# Patient Record
Sex: MALE | Race: WHITE
[De-identification: names, ages, dates, MRNs, and addresses within clinical notes are randomized per-mention and may not be internally consistent; named-entity substitution may affect disease eponyms.]

---

## 2017-03-06 LAB
ANION GAP SERPL CALC-SCNC: 6 MMOL/L (ref 3–11)
APPEARANCE UR: CLEAR
BASOPHILS # BLD: 0.04 K/UL (ref 0–0.2)
BASOPHILS NFR BLD: 0.5 %
BILIRUB UR-MCNC: (no result) MG/DL
BUN SERPL-MCNC: 19 MG/DL (ref 7–18)
BUN/CREAT SERPL: 18.9 (ref 10–20)
CALCIUM SERPL-MCNC: 8.7 MG/DL (ref 8.5–10.1)
CHLORIDE SERPL-SCNC: 105 MMOL/L (ref 98–107)
CO2 SERPL-SCNC: 31 MMOL/L (ref 21–32)
COLOR UR: YELLOW
COMPLETE: YES
CREAT CL PREDICTED SERPL C-G-VRATE: 86.1 ML/MIN
CREAT SERPL-MCNC: 1 MG/DL (ref 0.6–1.4)
EOSINOPHIL NFR BLD AUTO: 178 K/UL (ref 130–400)
GLUCOSE SERPL-MCNC: 85 MG/DL (ref 70–99)
HCT VFR BLD CALC: 43 % (ref 42–52)
IG%: 0.5 %
IMM GRANULOCYTES NFR BLD AUTO: 21.6 %
LYMPHOCYTES # BLD: 1.63 K/UL (ref 1.2–3.4)
MANUAL MICROSCOPIC REQUIRED?: NO
MCH RBC QN AUTO: 29.3 PG (ref 25–34)
MCHC RBC AUTO-ENTMCNC: 33.5 G/DL (ref 32–36)
MCV RBC AUTO: 87.4 FL (ref 80–100)
MONOCYTES NFR BLD: 8.3 %
NEUTROPHILS # BLD AUTO: 2.6 %
NEUTROPHILS NFR BLD AUTO: 66.5 %
NITRITE UR QL STRIP: (no result)
PH UR STRIP: 5 [PH] (ref 4.5–7.5)
PMV BLD AUTO: 8.9 FL (ref 7.4–10.4)
POTASSIUM SERPL-SCNC: 4.2 MMOL/L (ref 3.5–5.1)
RBC # BLD AUTO: 4.92 M/UL (ref 4.7–6.1)
REVIEW REQ?: NO
SODIUM SERPL-SCNC: 142 MMOL/L (ref 136–145)
SP GR UR STRIP: 1.02 (ref 1–1.03)
URINE BILL WITH OR WITHOUT MIC: (no result)
UROBILINOGEN UR-MCNC: (no result) MG/DL
WBC # BLD AUTO: 7.56 K/UL (ref 4.8–10.8)

## 2017-03-06 NOTE — DIAGNOSTIC IMAGING REPORT
CHEST PREADMISSION(PA/LAT)



CLINICAL HISTORY: PAT preoperative evaluation



COMPARISON STUDY:  6/16/2014



FINDINGS: Fixed lateral hernia. Lungs are clear. Diaphragms are smooth. 



IMPRESSION:  No acute process. 









Electronically signed by:  Franck Martinez M.D.

3/6/2017 9:51 AM



Dictated Date/Time:  3/6/2017 9:49 AM

## 2017-03-06 NOTE — PAT MEDICATION INSTRUCTIONS
Service Date


Mar 6, 2017.





Current Home Medication List


Albuterol Hfa (Ventolin Hfa), 2-4 PUFFS INH Q6H


Amoxicillin (Amoxil), 2,000 MG PO AS DIRECTED


Aspirin (Aspirin Ec), 81 MG PO QPM


Cholecalciferol (Vitamin D 1000 Unit), 1,000 INTER.UNIT PO BID


Colchicine (Colchicine), 0.6 MG PO PRN


Cyanocobalamin (Vitamin B12 500MCG), 500 MCG PO QAM


Dutasteride (Avodart), 0.5 MG PO QAM


Iron-Vitamin C (Vitron-C), 1 TAB PO BID


Metoprolol Succ (Toprol Xl) (Toprol-Xl), 12.5 MG PO QAM


Pantoprazole (Protonix), 40 MG PO QAM


Tadalafil (Cialis), 20 MG PO UD


Tamsulosin Hcl (Flomax), 0.4 MG PO BID





Medication Instructions


For Your Scheduled Surgery 





Amoxicillin (Amoxil), 2,000 MG PO AS DIRECTED (only takes prior to dental 

procedures)





Colchicine (Colchicine), 0.6 MG PO PRN (not taking currently)





- Hold the following medications 24 hours prior to surgery:


   Tadalafil (Cialis), 20 MG PO UD   





- Hold the following medications the morning of surgery:


   Iron-Vitamin C (Vitron-C), 1 TAB PO BID


   Cyanocobalamin (Vitamin B12 500MCG), 500 MCG PO QAM


   Cholecalciferol (Vitamin D 1000 Unit), 1,000 INTER.UNIT PO BID





- Take the following medications the morning of surgery with a sip of water:


   Tamsulosin Hcl (Flomax), 0.4 MG PO BID


   Pantoprazole (Protonix), 40 MG PO QAM


   Metoprolol Succ (Toprol Xl) (Toprol-Xl), 12.5 MG PO QAM


   Dutasteride (Avodart), 0.5 MG PO QAM


   Albuterol Hfa (Ventolin Hfa), 2-4 PUFFS INH Q6H (bring with you to hospital 

on day of surgery)





- Take the following medications as scheduled the night before surgery:


   Tamsulosin Hcl (Flomax), 0.4 MG PO BID


   Cholecalciferol (Vitamin D 1000 Unit), 1,000 INTER.UNIT PO BID


   Aspirin (Aspirin Ec), 81 MG PO QPM


   Albuterol Hfa (Ventolin Hfa), 2-4 PUFFS INH Q6H











If you have any questions please call us at [phone] or [phone] (

[employer]) or [phone]

## 2017-03-21 NOTE — HISTORY & PHYSICAL EXAMINATION
DATE OF ADMISSION:  03/22/2017

 

HISTORY OF PRESENT ILLNESS:  The patient presents to our office with a

complaint of balance discrepancy as well as upper extremity numbness and

paresthesias.  He also admits to bilateral leg pain.  He reports buckling of

his left leg.  He denies bowel or bladder changes.

 

PAST MEDICAL HISTORY:  Significant for arthritis, BPH.

 

PAST SURGICAL HISTORY:  Significant for hip replacement bilaterally and

hernia repair.

 

ALLERGIES:  None listed.

 

MEDICATIONS:  Include:

1.  Celebrex 200 mg twice a day.

2.  Simvastatin 20 mg a day.

3.  Flomax 0.4 mg daily.

 

SOCIAL HISTORY:  He is , has 4 children.  Denies alcohol.  Denies

tobacco use.

 

FAMILY HISTORY:  Significant for cancer, cardiovascular disease and

arthritis.

 

REVIEW OF SYSTEMS:  Significant for hearing loss, ringing in ears, muscle

weakness.

 

PHYSICAL EXAMINATION:

VITAL SIGNS:  6 feet 2 inches, 220 pounds.

HEENT:  Speech appropriate.

CARDIOPULMONARY:  No gross abnormalities.

ABDOMEN:  Soft, nondistended.

GENITOURINARY:  Deferred.

NEUROLOGIC:  Cranial nerves II-XII grossly intact.

MUSCULOSKELETAL:  He ambulates with an independent steady gait.  Strength is

intact bilaterally.

 

ASSESSMENT:  Severe spinal stenosis at C3-C4 with intrinsic cord changes,

bilateral neural foraminal stenosis C3-C4.

 

PLAN:  At this point in time, we have reviewed surgical intervention which

would require anterior cervical discectomy and fusion at C3-4.  Risks,

benefits, pros, cons, and alternatives were outlined in detail.  The patient

would like to proceed with the above-mentioned surgical planning.

## 2017-03-22 ENCOUNTER — HOSPITAL ENCOUNTER (INPATIENT)
Dept: HOSPITAL 45 - C.ACU | Age: 72
LOS: 1 days | Discharge: HOME | DRG: 473 | End: 2017-03-23
Attending: ORTHOPAEDIC SURGERY | Admitting: ORTHOPAEDIC SURGERY
Payer: COMMERCIAL

## 2017-03-22 VITALS
TEMPERATURE: 96.44 F | SYSTOLIC BLOOD PRESSURE: 150 MMHG | OXYGEN SATURATION: 96 % | DIASTOLIC BLOOD PRESSURE: 87 MMHG | HEART RATE: 82 BPM

## 2017-03-22 VITALS
SYSTOLIC BLOOD PRESSURE: 152 MMHG | TEMPERATURE: 97.7 F | OXYGEN SATURATION: 97 % | DIASTOLIC BLOOD PRESSURE: 74 MMHG | HEART RATE: 67 BPM

## 2017-03-22 VITALS
TEMPERATURE: 97.52 F | HEART RATE: 78 BPM | SYSTOLIC BLOOD PRESSURE: 171 MMHG | OXYGEN SATURATION: 95 % | DIASTOLIC BLOOD PRESSURE: 95 MMHG

## 2017-03-22 VITALS — HEART RATE: 65 BPM | DIASTOLIC BLOOD PRESSURE: 80 MMHG | OXYGEN SATURATION: 95 % | SYSTOLIC BLOOD PRESSURE: 136 MMHG

## 2017-03-22 VITALS
TEMPERATURE: 97.7 F | SYSTOLIC BLOOD PRESSURE: 157 MMHG | HEART RATE: 82 BPM | OXYGEN SATURATION: 97 % | DIASTOLIC BLOOD PRESSURE: 98 MMHG

## 2017-03-22 VITALS — HEART RATE: 52 BPM | OXYGEN SATURATION: 94 % | SYSTOLIC BLOOD PRESSURE: 135 MMHG | DIASTOLIC BLOOD PRESSURE: 78 MMHG

## 2017-03-22 VITALS
DIASTOLIC BLOOD PRESSURE: 74 MMHG | HEART RATE: 80 BPM | OXYGEN SATURATION: 96 % | TEMPERATURE: 97.7 F | SYSTOLIC BLOOD PRESSURE: 136 MMHG

## 2017-03-22 VITALS
OXYGEN SATURATION: 95 % | DIASTOLIC BLOOD PRESSURE: 78 MMHG | HEART RATE: 81 BPM | SYSTOLIC BLOOD PRESSURE: 136 MMHG | TEMPERATURE: 97.34 F

## 2017-03-22 VITALS — HEART RATE: 84 BPM | OXYGEN SATURATION: 96 %

## 2017-03-22 VITALS — HEART RATE: 53 BPM | OXYGEN SATURATION: 98 %

## 2017-03-22 VITALS
SYSTOLIC BLOOD PRESSURE: 148 MMHG | HEART RATE: 79 BPM | OXYGEN SATURATION: 96 % | TEMPERATURE: 94.46 F | DIASTOLIC BLOOD PRESSURE: 77 MMHG

## 2017-03-22 VITALS — SYSTOLIC BLOOD PRESSURE: 131 MMHG | OXYGEN SATURATION: 95 % | HEART RATE: 88 BPM | DIASTOLIC BLOOD PRESSURE: 75 MMHG

## 2017-03-22 VITALS — TEMPERATURE: 97.7 F

## 2017-03-22 VITALS — HEART RATE: 68 BPM | OXYGEN SATURATION: 98 %

## 2017-03-22 VITALS — OXYGEN SATURATION: 98 % | HEART RATE: 73 BPM

## 2017-03-22 VITALS
BODY MASS INDEX: 31.52 KG/M2 | BODY MASS INDEX: 31.52 KG/M2 | HEIGHT: 74 IN | WEIGHT: 245.59 LBS | HEIGHT: 74 IN | WEIGHT: 245.59 LBS

## 2017-03-22 DIAGNOSIS — M48.02: Primary | ICD-10-CM

## 2017-03-22 DIAGNOSIS — N40.0: ICD-10-CM

## 2017-03-22 DIAGNOSIS — Z79.82: ICD-10-CM

## 2017-03-22 DIAGNOSIS — Z79.899: ICD-10-CM

## 2017-03-22 DIAGNOSIS — M19.90: ICD-10-CM

## 2017-03-22 DIAGNOSIS — Z82.61: ICD-10-CM

## 2017-03-22 DIAGNOSIS — Z82.49: ICD-10-CM

## 2017-03-22 DIAGNOSIS — Z96.643: ICD-10-CM

## 2017-03-22 DIAGNOSIS — M54.12: ICD-10-CM

## 2017-03-22 PROCEDURE — 0RG1070 FUSION OF CERVICAL VERTEBRAL JOINT WITH AUTOLOGOUS TISSUE SUBSTITUTE, ANTERIOR APPROACH, ANTERIOR COLUMN, OPEN APPROACH: ICD-10-PCS | Performed by: ORTHOPAEDIC SURGERY

## 2017-03-22 PROCEDURE — 0RT30ZZ RESECTION OF CERVICAL VERTEBRAL DISC, OPEN APPROACH: ICD-10-PCS | Performed by: ORTHOPAEDIC SURGERY

## 2017-03-22 RX ADMIN — DOCUSATE SODIUM SCH MG: 100 CAPSULE, LIQUID FILLED ORAL at 20:36

## 2017-03-22 RX ADMIN — ALBUTEROL SULFATE SCH PUFFS: 90 AEROSOL, METERED RESPIRATORY (INHALATION) at 18:06

## 2017-03-22 RX ADMIN — ALUMINUM ZIRCONIUM TRICHLOROHYDREX GLY SCH EA: 0.2 STICK TOPICAL at 15:29

## 2017-03-22 RX ADMIN — CEFAZOLIN SCH MLS/HR: 10 INJECTION, POWDER, FOR SOLUTION INTRAVENOUS at 23:27

## 2017-03-22 RX ADMIN — ALBUTEROL SULFATE SCH PUFFS: 90 AEROSOL, METERED RESPIRATORY (INHALATION) at 23:26

## 2017-03-22 RX ADMIN — FENTANYL CITRATE PRN MCG: 50 INJECTION, SOLUTION INTRAMUSCULAR; INTRAVENOUS at 09:58

## 2017-03-22 RX ADMIN — DEXAMETHASONE SODIUM PHOSPHATE SCH MLS/MIN: 4 INJECTION, SOLUTION INTRA-ARTICULAR; INTRALESIONAL; INTRAMUSCULAR; INTRAVENOUS; SOFT TISSUE at 15:28

## 2017-03-22 RX ADMIN — ALBUTEROL SULFATE SCH PUFFS: 90 AEROSOL, METERED RESPIRATORY (INHALATION) at 12:14

## 2017-03-22 RX ADMIN — FENTANYL CITRATE PRN MCG: 50 INJECTION, SOLUTION INTRAMUSCULAR; INTRAVENOUS at 10:08

## 2017-03-22 RX ADMIN — SODIUM CHLORIDE SCH MLS/HR: 900 INJECTION, SOLUTION INTRAVENOUS at 11:29

## 2017-03-22 RX ADMIN — HYDROCODONE BITARTRATE AND ACETAMINOPHEN PRN TAB: 5; 325 TABLET ORAL at 14:37

## 2017-03-22 RX ADMIN — ALUMINUM ZIRCONIUM TRICHLOROHYDREX GLY SCH EA: 0.2 STICK TOPICAL at 23:28

## 2017-03-22 RX ADMIN — DEXAMETHASONE SODIUM PHOSPHATE SCH MLS/MIN: 4 INJECTION, SOLUTION INTRA-ARTICULAR; INTRALESIONAL; INTRAMUSCULAR; INTRAVENOUS; SOFT TISSUE at 23:27

## 2017-03-22 RX ADMIN — FENTANYL CITRATE PRN MCG: 50 INJECTION, SOLUTION INTRAMUSCULAR; INTRAVENOUS at 09:53

## 2017-03-22 RX ADMIN — CEFAZOLIN SCH MLS/HR: 10 INJECTION, POWDER, FOR SOLUTION INTRAVENOUS at 15:35

## 2017-03-22 RX ADMIN — TAMSULOSIN HYDROCHLORIDE SCH MG: 0.4 CAPSULE ORAL at 20:36

## 2017-03-22 RX ADMIN — SODIUM CHLORIDE SCH MLS/HR: 900 INJECTION, SOLUTION INTRAVENOUS at 22:18

## 2017-03-22 NOTE — OPERATIVE REPORT
DATE OF OPERATION:  03/22/2017

 

PREOPERATIVE DIAGNOSIS:  Cervical spinal stenosis with myeloradiculopathy.

 

POSTOPERATIVE DIAGNOSIS:  Same.

 

PROCEDURE PERFORMED:

1.  Anterior cervical discectomy, bilateral foraminotomy C3-C4.

2.  Anterior cervical arthrodesis C3-C4.

3.  Placement of cortical autograft 8 mm in height filled with DBM, C3-C4.

4.  Application of Thakkar plate and screws across C3-C4.

 

SURGEON:  Dr. Lane Maloney.

 

ANESTHESIA:  General.

 

DISPOSITION:  The patient awakened and taken to PACU in stable condition.

 

HISTORY OF PATIENT'S PROBLEMS:  This is a 72-year-old male who presents with

above-mentioned diagnosis.  After failing an extensive course of nonoperative

care, elected to undergo the above-mentioned procedure.  Risks, benefits,

pros, cons, and alternatives were outlined in detail preoperatively.

 

DESCRIPTION OF PROCEDURE:  The patient was met with preoperatively, the case

discussed and all questions were addressed.  At that point, the patient was

taken back to operative suite, and after undergoing successful general

intubation via department of anesthesia, was placed in a supine position with

the head in [place] head villarreal.  All bony prominences were well padded and

the eyes were inspected to ensure there was no external pressure placed upon

them.  At this point, the anterior cervical spine was prepped and draped in

normal sterile fashion.  With the assistance of fluoroscopy, we identified

the C3-C4 disc space and transverse incision was placed along the right

anterior aspect of the cervical spine overlying this region.  Sharp

dissection with the assistance of bipolar electrocautery performed down to

and exposing the anterior cervical spine from C3-C4.  A self-retaining

retractor was placed.  We verified our position with fluoroscopy.  A complete

discectomy was then performed out to the uncovertebral joints bilaterally. 

Tubac distracting pins were utilized to assist us in our visualization.  We

did remove all posterior annular fibers, longitudinal ligament and all spurs.

 The endplates were then burred to subcortical bleeding bone and 8 mm

cortical autograft filled with DBM tapped in position.  Distracting apparatus

was removed.  All anterior osteophytes burred to a smooth cortical surface

and Thakkar plate and screws applied with the assistance of fluoroscopy.  The

incision was then copiously irrigated, explored to ensure there was no damage

to surrounding structures or remaining bleeding, and a 10 round RUMA drain

inserted, then closed with 2-0 Vicryl in the fascia, 4-0 Monocryl for final

skin closure.  Steri-Strips and sterile dressing placed.  The patient was

awakened and taken to PACU in stable condition.

 

 

I attest to the content of the Intraoperative Record and any orders documented therein. Any exceptio
ns are noted below.

## 2017-03-22 NOTE — [facility] POST OPERATIVE BRIEF NOTE
Immediate Operative Summary


Operative Date


Mar 22, 2017.





Pre-Operative Diagnosis





Severe Spinal Stenosis C3-C4, Intrinsic Cord Change, Bilateral Neural Foraminal 


Stenosis C3-C4





Post-Operative Diagnosis





Severe Spinal Stenosis C3-C4, Intrinsic Cord Change, Bilateral Neural Foraminal 


Stenosis C3-C4





Procedure(s) Performed





C3-C4 Anterior Cervical Discectomy and Fusion; Removal of Intervertebral 


Disc/Decompression; Placement of Prosthetic Spacer; Anterior Plate and Screw 


Fixation





Surgeon


Dr. Maloney





Assistant Surgeon(s)


none





Estimated Blood Loss


20 cc





Findings


stenosis





Specimens





none per surgeon

## 2017-03-22 NOTE — ANESTHESIOLOGY PROGRESS NOTE
Anesthesia Post Op Note


Date & Time


Mar 22, 2017 at 10:24





Vital Signs


Pain Intensity:  4





 Vital Signs Past 12 Hours








  Date Time  Temp Pulse Resp B/P Pulse Ox O2 Delivery O2 Flow Rate FiO2


 


3/22/17 10:15  71 16 134/82 97 Nasal Cannula 5 


 


3/22/17 10:05  78 16 147/79 99 Nasal Cannula 5 


 


3/22/17 09:55  79 16 163/94 94 Mask 10 


 


3/22/17 09:45  79 14 143/86 97 Mask 10 


 


3/22/17 09:35  68 14 154/83 98 Mask 10 


 


3/22/17 09:25 36.0 74 14 148/86 99 Mask 10 


 


3/22/17 06:07 36.5 67 18 152/74 97 Room Air  











Notes


Mental Status:  alert / awake / arousable, participated in evaluation


Pt Amnestic to Procedure:  Yes


Nausea / Vomiting:  adequately controlled


Pain:  adequately controlled


Airway Patency, RR, SpO2:  stable & adequate


BP & HR:  stable & adequate


Hydration State:  stable & adequate


Anesthetic Complications:  no major complications apparent

## 2017-03-22 NOTE — DIAGNOSTIC IMAGING REPORT
INTRAOPERATIVE RADIOGRAPHS



CLINICAL HISTORY: C3-C4 spinal fusion.



Fluoroscopy time: 7 seconds.



FINDINGS: 2 spot fluoroscopic views of the cervical spine are presented. There

is evidence of discectomy at C3-C4 with anterior fusion at this level. The

orthopedic hardware appears intact. An endotracheal tube is noted.



IMPRESSION: Intraoperative images from anterior fusion at C3-C4 as above.







Electronically signed by:  Long Tiwari M.D.

3/22/2017 9:55 AM



Dictated Date/Time:  3/22/2017 9:54 AM

## 2017-03-23 VITALS
TEMPERATURE: 98.24 F | HEART RATE: 79 BPM | DIASTOLIC BLOOD PRESSURE: 78 MMHG | OXYGEN SATURATION: 98 % | SYSTOLIC BLOOD PRESSURE: 164 MMHG

## 2017-03-23 VITALS
OXYGEN SATURATION: 93 % | DIASTOLIC BLOOD PRESSURE: 86 MMHG | TEMPERATURE: 97.7 F | SYSTOLIC BLOOD PRESSURE: 142 MMHG | HEART RATE: 67 BPM

## 2017-03-23 VITALS
DIASTOLIC BLOOD PRESSURE: 81 MMHG | TEMPERATURE: 97.7 F | HEART RATE: 68 BPM | OXYGEN SATURATION: 95 % | SYSTOLIC BLOOD PRESSURE: 138 MMHG

## 2017-03-23 VITALS
HEART RATE: 67 BPM | DIASTOLIC BLOOD PRESSURE: 75 MMHG | TEMPERATURE: 98.24 F | OXYGEN SATURATION: 95 % | SYSTOLIC BLOOD PRESSURE: 138 MMHG

## 2017-03-23 VITALS — HEART RATE: 71 BPM | OXYGEN SATURATION: 98 %

## 2017-03-23 VITALS
TEMPERATURE: 97.34 F | DIASTOLIC BLOOD PRESSURE: 70 MMHG | HEART RATE: 59 BPM | SYSTOLIC BLOOD PRESSURE: 155 MMHG | OXYGEN SATURATION: 96 %

## 2017-03-23 VITALS — OXYGEN SATURATION: 96 % | HEART RATE: 65 BPM

## 2017-03-23 VITALS — OXYGEN SATURATION: 98 %

## 2017-03-23 VITALS — HEART RATE: 60 BPM | OXYGEN SATURATION: 96 %

## 2017-03-23 RX ADMIN — DOCUSATE SODIUM SCH MG: 100 CAPSULE, LIQUID FILLED ORAL at 10:09

## 2017-03-23 RX ADMIN — ALUMINUM ZIRCONIUM TRICHLOROHYDREX GLY SCH EA: 0.2 STICK TOPICAL at 08:00

## 2017-03-23 RX ADMIN — DEXAMETHASONE SODIUM PHOSPHATE SCH MLS/MIN: 4 INJECTION, SOLUTION INTRA-ARTICULAR; INTRALESIONAL; INTRAMUSCULAR; INTRAVENOUS; SOFT TISSUE at 08:11

## 2017-03-23 RX ADMIN — ALBUTEROL SULFATE SCH PUFFS: 90 AEROSOL, METERED RESPIRATORY (INHALATION) at 05:48

## 2017-03-23 RX ADMIN — SODIUM CHLORIDE SCH MLS/HR: 900 INJECTION, SOLUTION INTRAVENOUS at 10:11

## 2017-03-23 RX ADMIN — TAMSULOSIN HYDROCHLORIDE SCH MG: 0.4 CAPSULE ORAL at 10:09

## 2017-03-23 RX ADMIN — CEFAZOLIN SCH MLS/HR: 10 INJECTION, POWDER, FOR SOLUTION INTRAVENOUS at 08:11

## 2017-03-23 RX ADMIN — HYDROCODONE BITARTRATE AND ACETAMINOPHEN PRN TAB: 5; 325 TABLET ORAL at 01:47

## 2017-03-23 NOTE — ANESTHESIOLOGY PROGRESS NOTE
Anesthesia Post Op Note


Date & Time


Mar 23, 2017 at 10:40





Vital Signs


Pain Intensity:  2.0





 Vital Signs Past 12 Hours








  Date Time  Temp Pulse Resp B/P Pulse Ox O2 Delivery O2 Flow Rate FiO2


 


3/23/17 10:26 36.8 79 16  98 Room Air  


 


3/23/17 09:00     98 Nasal Cannula 2.0 


 


3/23/17 08:00      Nasal Cannula 2.0 


 


3/23/17 08:00 36.8 79 16 164/78 98 Humidified Oxygen 2.0 


 


3/23/17 07:52  71 16  98 Nasal Cannula 3.0 


 


3/23/17 05:45 36.5 68 16 138/81 95 Nasal Cannula 3.0 





      Humidified Air  


 


3/23/17 04:06  65 16  96 Nasal Cannula 3.0 


 


3/23/17 04:00 36.8 67 14 138/75 95 Nasal Cannula 3.0 





      Humidified Air  


 


3/23/17 01:41 36.5 67 14 142/86 93 Nasal Cannula 3.0 





      Humidified Air  


 


3/22/17 23:35 36.5 80 16 136/74 96 Nasal Cannula 2.0 





      Humidified Air  


 


3/22/17 23:19  68 16  98 Nasal Cannula 3.0 


 


3/22/17 23:15      Nasal Cannula 3.0 





      Humidified Air  











Notes


Mental Status:  alert / awake / arousable, participated in evaluation


Pt Amnestic to Procedure:  Yes


Nausea / Vomiting:  adequately controlled


Pain:  adequately controlled


Airway Patency, RR, SpO2:  stable & adequate


BP & HR:  stable & adequate


Hydration State:  stable & adequate


Anesthetic Complications:  no major complications apparent

## 2017-03-23 NOTE — DISCHARGE SUMMARY
PRINCIPLE DIAGNOSIS:  Cervical stenosis.

 

HOSPITAL COURSE AS FOLLOWS:  On March 22, the patient underwent anterior

cervical discectomy and fusion, tolerated this well and taken to the

orthopedic floor postoperatively.  Postop day #1, he swallowing has markedly

improved.  RUMA drain decreased appropriately.  Arm symptoms doing well. 

Subsequently discharged home.  Discharge orders and instructions found on the

chart for further review.

## 2017-03-23 NOTE — DISCHARGE INSTRUCTIONS
Discharge Instructions


Date of Service


Mar 23, 2017.





Admission


Reason for Admission:  Cervical Spinal Stenosis





Discharge


Discharge Diagnosis / Problem:  stenosis





Discharge Goals


Goal(s):  Improve function





Activity Recommendations


Activity Limitations:  per Instructions/Follow-up section





.





Instructions / Follow-Up


Instructions / Follow-Up





ACTIVITY RECOMMENDATIONS:








SELF CARE INSTRUCTIONS AFTER CERVICAL FUSIONS





1.  No smoking.  Smoking drastically decreases the chance of a solid fusion.





2.  No bending, lifting more than 5 pounds, or twisting (roll like a log when


     turning in bed).





3.  You may shower 3 days after surgery.  Thoroughly dry wound.  Do not


     soak in the tub.





4.  Cervical collar:  Must be worn at all times including sleeping.  You may


     remove the brace only to bath, eat and if you are sitting in a recliner.





5.  Please walk as much as you can for exercise.  Gradually increase the 


     distance that you walk as your endurance increases.








SPECIAL CARE INSTRUCTIONS:





**VERY IMPORTANT TO READ AND REVIEW**





A.  Do not take any anti-inflammatory medications (i.e. Indocin, Advil, Aspirin,


     Naprosyn, Aleve, Motrin, etc.) as these may inhibit the chance of a solid 


     fusion.  Tylenol is okay to take.





B.  Your surgical incision has been closed with a cosmetic suture under the skin


     that will dissolve in about 6 weeks.  In 14 days, you can use a pair of 

clean


     scissors and cut the suture that is left outside of the skin at the ends 

of 


     your incision.





C.  Complications are uncommon, but please contact us if you have any signs or 


     symptoms of:


   1.  wound infection (fever higher than 102.5 degrees F, redness, separation


              of wound, drainage, or increasing pain from the incision) 


   2.  blood clots in legs (pain, swelling, redness and warmth in legs)


   3.  urinary tract infection (fever higher than 102.5 degrees, burning upon 


              urination or increased frequency of urination)


   4.  nerve problems (inability to walk on your toes or heels, numbness, loss 


              of bowel or bladder control)


   5.  any other symptoms that concern you.





D.  Please call the office at [phone] if you have any concerns or 

questions 


     about your operation or recovery.








MANAGING PAIN AFTER SPINAL SURGERY





1.  Narcotic medication is intended for short-term use and will be provided for 


     surgical pain.  Surgical pain usually lasts for a period of 4-6 weeks.  

Narcotic


     medication includes Percocet, Vicodin, Darvocet, Tylenol #3 or Lortab.





2.  Longer-term pain is more appropriately treated with non-narcotic medication


    such as Tylenol ES.





3.  Muscle spasm is not appropriately treated with narcotics.  Muscle relaxers 

such


    as Soma, Flexeril or Skelaxin can be used along with Tylenol ES.





4.  Remember that we all live with some "aches and pains".  This is not unusual 

or 


     uncommon after an injury or as we get older.





5.  We will provide appropriate medication within the normal guidelines of 

their 


     prescribed use.  We will also be very cautious and aware of potential abuse


     and extended duration of patients' medication needs.





6.  Please allow 2-3 days to process refills.  Prescriptions will not be mailed 

but 


    must be picked up at the office.








FOLLOW UP VISIT:





Keep your scheduled follow-up appointment. 





Any questions, please call the office at [phone].





Current Hospital Diet


Patient's current hospital diet: Clear Liquid Diet





Discharge Diet


Recommended Diet:  Regular Diet





Procedures


Procedures Performed:  


C3-C4 Anterior Cervical Discectomy and Fusion; Removal of Intervertebral 


Disc/Decompression; Placement of Prosthetic Spacer; Anterior Plate and Screw 


Fixation





Pending Studies


Studies pending at discharge:  no





Medical Emergencies








.


Who to Call and When:





Medical Emergencies:  If at any time you feel your situation is an emergency, 

please call 911 immediately.





.





Non-Emergent Contact


Non-Emergency issues call your:  Primary Care Provider


.





"Provider Documentation" section prepared by Lane Maloney.





VTE Core Measure


Inpt VTE Proph given/why not?:  T.E.D. Stockings, SCD's

## 2017-03-26 ENCOUNTER — HOSPITAL ENCOUNTER (OUTPATIENT)
Dept: HOSPITAL 45 - C.EDB | Age: 72
Setting detail: OBSERVATION
LOS: 2 days | Discharge: HOME | End: 2017-03-28
Attending: ORTHOPAEDIC SURGERY | Admitting: ORTHOPAEDIC SURGERY
Payer: COMMERCIAL

## 2017-03-26 VITALS
HEIGHT: 74 IN | WEIGHT: 249.56 LBS | BODY MASS INDEX: 32.03 KG/M2 | WEIGHT: 249.56 LBS | BODY MASS INDEX: 32.03 KG/M2 | HEIGHT: 74 IN

## 2017-03-26 VITALS
OXYGEN SATURATION: 93 % | HEART RATE: 64 BPM | TEMPERATURE: 98.24 F | DIASTOLIC BLOOD PRESSURE: 94 MMHG | SYSTOLIC BLOOD PRESSURE: 175 MMHG

## 2017-03-26 VITALS
DIASTOLIC BLOOD PRESSURE: 98 MMHG | HEART RATE: 74 BPM | TEMPERATURE: 98.42 F | SYSTOLIC BLOOD PRESSURE: 181 MMHG | OXYGEN SATURATION: 95 %

## 2017-03-26 VITALS — SYSTOLIC BLOOD PRESSURE: 169 MMHG | OXYGEN SATURATION: 96 % | HEART RATE: 75 BPM | DIASTOLIC BLOOD PRESSURE: 101 MMHG

## 2017-03-26 VITALS — SYSTOLIC BLOOD PRESSURE: 150 MMHG | DIASTOLIC BLOOD PRESSURE: 89 MMHG | HEART RATE: 70 BPM

## 2017-03-26 VITALS
TEMPERATURE: 97.7 F | HEART RATE: 77 BPM | DIASTOLIC BLOOD PRESSURE: 100 MMHG | OXYGEN SATURATION: 98 % | SYSTOLIC BLOOD PRESSURE: 193 MMHG

## 2017-03-26 DIAGNOSIS — M19.90: ICD-10-CM

## 2017-03-26 DIAGNOSIS — Z98.1: ICD-10-CM

## 2017-03-26 DIAGNOSIS — Z96.649: ICD-10-CM

## 2017-03-26 DIAGNOSIS — D50.9: ICD-10-CM

## 2017-03-26 DIAGNOSIS — R13.10: Primary | ICD-10-CM

## 2017-03-26 DIAGNOSIS — N40.0: ICD-10-CM

## 2017-03-26 DIAGNOSIS — K21.9: ICD-10-CM

## 2017-03-26 LAB
ANION GAP SERPL CALC-SCNC: 5 MMOL/L (ref 3–11)
BASOPHILS # BLD: 0.03 K/UL (ref 0–0.2)
BASOPHILS NFR BLD: 0.7 %
BUN SERPL-MCNC: 16 MG/DL (ref 7–18)
BUN/CREAT SERPL: 16.3 (ref 10–20)
CALCIUM SERPL-MCNC: 8.9 MG/DL (ref 8.5–10.1)
CHLORIDE SERPL-SCNC: 101 MMOL/L (ref 98–107)
CO2 SERPL-SCNC: 33 MMOL/L (ref 21–32)
COMPLETE: YES
CREAT CL PREDICTED SERPL C-G-VRATE: 91.3 ML/MIN
CREAT SERPL-MCNC: 0.95 MG/DL (ref 0.6–1.4)
EOSINOPHIL NFR BLD AUTO: 208 K/UL (ref 130–400)
GLUCOSE SERPL-MCNC: 84 MG/DL (ref 70–99)
HCT VFR BLD CALC: 41.8 % (ref 42–52)
IG%: 0.2 %
IMM GRANULOCYTES NFR BLD AUTO: 22.9 %
LYMPHOCYTES # BLD: 0.97 K/UL (ref 1.2–3.4)
MCH RBC QN AUTO: 29.7 PG (ref 25–34)
MCHC RBC AUTO-ENTMCNC: 33.3 G/DL (ref 32–36)
MCV RBC AUTO: 89.3 FL (ref 80–100)
MONOCYTES NFR BLD: 9.7 %
NEUTROPHILS # BLD AUTO: 4.5 %
NEUTROPHILS NFR BLD AUTO: 62 %
PMV BLD AUTO: 8.8 FL (ref 7.4–10.4)
POTASSIUM SERPL-SCNC: 3.9 MMOL/L (ref 3.5–5.1)
RBC # BLD AUTO: 4.68 M/UL (ref 4.7–6.1)
SODIUM SERPL-SCNC: 139 MMOL/L (ref 136–145)
WBC # BLD AUTO: 4.24 K/UL (ref 4.8–10.8)

## 2017-03-26 RX ADMIN — SODIUM CHLORIDE SCH MLS/HR: 900 INJECTION, SOLUTION INTRAVENOUS at 17:34

## 2017-03-26 RX ADMIN — LORAZEPAM PRN MLS/MIN: 2 INJECTION INTRAMUSCULAR; INTRAVENOUS at 22:46

## 2017-03-26 RX ADMIN — DEXAMETHASONE SODIUM PHOSPHATE SCH MLS/MIN: 4 INJECTION, SOLUTION INTRA-ARTICULAR; INTRALESIONAL; INTRAMUSCULAR; INTRAVENOUS; SOFT TISSUE at 18:13

## 2017-03-26 NOTE — EMERGENCY ROOM VISIT NOTE
ED Visit Note


First contact with patient:  11:22


I have seen and examined this patient with Jermaine Quick and generally agree 

with the treatment plan as discussed.


Problem List


Medical Problems:


(1) Arthritis


Status: Chronic  





(2) Hernia


Status: Chronic  





(3) Iron deficiency anemia


Status: Chronic  





Surgical Problems:


(1) History of total hip replacement


Status: Chronic  











Current/Historical Medications


Scheduled


Albuterol Hfa (Ventolin Hfa), 2-4 PUFFS INH Q6H


Amoxicillin (Amoxil), 2,000 MG PO AS DIRECTED


Aspirin (Aspirin Ec), 81 MG PO QPM


Cholecalciferol (Vitamin D 1000 Unit), 1,000 INTER.UNIT PO BID


Colchicine (Colchicine), 0.6 MG PO PRN


Cyanocobalamin (Vitamin B12 500MCG), 500 MCG PO QAM


Dutasteride (Avodart), 0.5 MG PO QAM


Iron-Vitamin C (Vitron-C), 1 TAB PO BID


Metoprolol Succ (Toprol Xl) (Toprol-Xl), 12.5 MG PO QAM


Pantoprazole (Protonix), 40 MG PO QAM


Tadalafil (Cialis), 20 MG PO UD


Tamsulosin Hcl (Flomax), 0.4 MG PO BID





Scheduled PRN


Hydrocodone/Acetaminophen 5MG/325MG (Norco 5MG/325MG), 1-2 TAB PO Q4H PRN for 

moderate-severe pain





Allergies


Coded Allergies:  


     No Known Allergies (Verified , 3/26/17)





Vital Signs











  Date Time  Temp Pulse Resp B/P Pulse Ox O2 Delivery O2 Flow Rate FiO2


 


3/26/17 11:09     99 Room Air  


 


3/26/17 11:09 36.6 99 18 185/116 99 Room Air  











Laboratory Results











Test


  3/26/17


11:45











Departure Information


Referrals


Florentin Esparza M.D. (PCP)





Patient Instructions


My [facility]

## 2017-03-26 NOTE — EMERGENCY ROOM VISIT NOTE
History


First contact with patient:  11:22


Chief Complaint:  THROAT PAIN/INJURY


Stated Complaint:  SURGERY 03/22,CANNOT SWALLOW WATER/FOOD





History of Present Illness


The patient is a 72 year old white male who presents to the Emergency Room with 

complaints of inability to swallow.  Patient states he had an ACDF on Wednesday 

by Dr. Maloney.  He has been having difficulty swallowing since immediately 

after the procedure.  He reportedly had a RUMA drain in place but there was 

minimal output.  The drain was pulled.  He has had continued difficulty 

swallowing liquids and solids.  He is able to swallow most of his saliva but 

occasionally has to spit it out.  He notes this is more frequent when trying to 

sleep.  He has difficulty with swallowing water.  No fevers or chills.  His 

daughter accompanies him today.  No increase in pain.  He states the choking 

sensation with reclining and this does cause him to become anxious.  He did 

[facility], and states he was instructed to come to the 

ED for further evaluation.  Pain is 8/10.





Review of Systems


REVIEW OF SYSTEM:


HEENT:  No dizziness, visual problems, hearing loss, or tinnitus.  There is 

difficulty swallowing.


LYMPH:  No adenopathy.


PULMONARY: No cough, shortness of breath, sputum production or hemoptysis.


CARDIOVASCULAR:  No chest pain, palpitations, shortness of breath or peripheral 

edema.


GASTROINTESTINAL:  No diarrhea, constipation, nausea, vomiting, or abdominal 

pain.


GENITOURINARY:  No dysuria, frequency, urgency or nocturia.


NEUROLOGIC:  No weakness, muscle tenderness, epilepsy or history of 

neurological problems.


MUSCULOSKELETAL: No history of joint tenderness/swelling.  Positive history of 

arthritis and arthralgias.


SKIN:  No rashes or lesions.


PSYCHIATRIC: No history of depression or mental illness.


ENDOCRINE:  No history of diabetes, thyroid disorders, or abnormal hair growth.





Past Medical/Surgical History


Medical Problems:


(1) Arthritis


(2) Cervical stenosis of spinal canal


(3) Hernia


(4) Iron deficiency anemia


Surgical Problems:


(1) History of total hip replacement


GERD.


ACDF 03/22/2017





Family History





Cancer


Heart disease


Hypertension





Social History


Smoking Status:  Never Smoker


Smokeless Tobacco Use:  No


Alcohol Use:  occasionally


Drug Use:  none


Marital Status:  


Housing Status:  lives with family


Occupation Status:  retired





Current/Historical Medications


Scheduled


Albuterol Hfa (Ventolin Hfa), 2-4 PUFFS INH Q6H


Amoxicillin (Amoxil), 2,000 MG PO AS DIRECTED


Aspirin (Aspirin Ec), 81 MG PO QPM


Cholecalciferol (Vitamin D 1000 Unit), 1,000 INTER.UNIT PO BID


Colchicine (Colchicine), 0.6 MG PO PRN


Cyanocobalamin (Vitamin B12 500MCG), 500 MCG PO QAM


Dutasteride (Avodart), 0.5 MG PO QAM


Iron-Vitamin C (Vitron-C), 1 TAB PO BID


Metoprolol Succ (Toprol Xl) (Toprol-Xl), 12.5 MG PO QAM


Pantoprazole (Protonix), 40 MG PO QAM


Tadalafil (Cialis), 20 MG PO UD


Tamsulosin Hcl (Flomax), 0.4 MG PO BID





Scheduled PRN


Hydrocodone/Acetaminophen 5MG/325MG (Norco 5MG/325MG), 1-2 TAB PO Q4H PRN for 

moderate-severe pain





Allergies


Coded Allergies:  


     No Known Allergies (Verified , 3/26/17)





Physical Exam


Vital Signs











  Date Time  Temp Pulse Resp B/P Pulse Ox O2 Delivery O2 Flow Rate FiO2


 


3/26/17 15:10  68 20 160/94 95 Room Air  


 


3/26/17 13:10  80 20 164/109 94 Room Air  


 


3/26/17 11:09     99 Room Air  


 


3/26/17 11:09 36.6 99 18 185/116 99 Room Air  











Physical Exam


Gen.: Well-developed, well-nourished, elderly white male, in no obvious 

discomfort.  No acute distress.  Mildly anxious.  Skin:Warm and dry with good 

turgor.  No rashes or lesions.  No ecchymosis or erythema.  The patient is not  

diaphoretic.  No abrasions.  He does have a bandage present on the anterior 

neck.  HEENT: Normocephalic atraumatic.  Eyes PERRLA, EOMI.  No conjunctiva or 

scleral injection.  Ears TMs intact bilaterally with good light reflexes.  No 

erythema or bulging.  No hemotympanum.  Canals are patent.  Nares patent 

bilaterally without turbinate enlargement.  No significant drainage.  No 

epistaxis. Oropharynx without erythema or exudate.  Uvula midline, oral mucosa 

moist.  No lesions present.  He is swallowing his own saliva currently.  No 

trismus.  Neck: He is currently in a [place] J type brace.  No pain with 

palpation around the neck.  No appreciable swelling.  


Heart: Heart RRR.  No MGR.  Peripheral pulses are 2+.  


Lungs:  Lungs are clear to auscultation.  No crackles rhonchi or wheezing.  

Good air movement.  The patient is able to take a deep breath.


Musculoskeletal: Gross motor function of the upper and lower extremities is 

intact and unremarkable.





Medical Decision & Procedures


ER Provider


Diagnostic Interpretation:


CT scan imaging of the soft tissue of the neck with IV contrast was obtained 

today.  This was read by radiology as:


Postsurgical findings consistent with a C3-C4 fusion via a right anterior


approach. Small amount of low-attenuation fluid within the operative bed is


nonspecific but likely within normal limits in the early postoperative period.


No large hematoma. Mild infiltration and fluid adjacent to the cervical


esophagus which is not unexpected in the early postoperative setting. Suboptimal


evaluation of the airway. Apparent mild airway narrowing could be artifactual


and could be correlated with respiratory symptoms.





Laboratory Results


3/26/17 11:45








Red Blood Count 4.68, Mean Corpuscular Volume 89.3, Mean Corpuscular Hemoglobin 

29.7, Mean Corpuscular Hemoglobin Concent 33.3, Mean Platelet Volume 8.8, 

Neutrophils (%) (Auto) 62.0, Lymphocytes (%) (Auto) 22.9, Monocytes (%) (Auto) 

9.7, Eosinophils (%) (Auto) 4.5, Basophils (%) (Auto) 0.7, Neutrophils # (Auto) 

2.63, Lymphocytes # (Auto) 0.97, Monocytes # (Auto) 0.41, Eosinophils # (Auto) 

0.19, Basophils # (Auto) 0.03





3/26/17 11:45

















Test


  3/26/17


11:45


 


White Blood Count


  4.24 K/uL


(4.8-10.8)


 


Red Blood Count


  4.68 M/uL


(4.7-6.1)


 


Hemoglobin


  13.9 g/dL


(14.0-18.0)


 


Hematocrit 41.8 % (42-52) 


 


Mean Corpuscular Volume


  89.3 fL


()


 


Mean Corpuscular Hemoglobin


  29.7 pg


(25-34)


 


Mean Corpuscular Hemoglobin


Concent 33.3 g/dl


(32-36)


 


Platelet Count


  208 K/uL


(130-400)


 


Mean Platelet Volume


  8.8 fL


(7.4-10.4)


 


Neutrophils (%) (Auto) 62.0 % 


 


Lymphocytes (%) (Auto) 22.9 % 


 


Monocytes (%) (Auto) 9.7 % 


 


Eosinophils (%) (Auto) 4.5 % 


 


Basophils (%) (Auto) 0.7 % 


 


Neutrophils # (Auto)


  2.63 K/uL


(1.4-6.5)


 


Lymphocytes # (Auto)


  0.97 K/uL


(1.2-3.4)


 


Monocytes # (Auto)


  0.41 K/uL


(0.11-0.59)


 


Eosinophils # (Auto)


  0.19 K/uL


(0-0.5)


 


Basophils # (Auto)


  0.03 K/uL


(0-0.2)


 


RDW Standard Deviation


  54.9 fL


(36.4-46.3)


 


RDW Coefficient of Variation


  16.6 %


(11.5-14.5)


 


Immature Granulocyte % (Auto) 0.2 % 


 


Immature Granulocyte # (Auto)


  0.01 K/uL


(0.00-0.02)


 


Anion Gap


  5.0 mmol/L


(3-11)


 


Est Creatinine Clear Calc


Drug Dose 91.3 ml/min 


 


 


Estimated GFR (


American) 91.0 


 


 


Estimated GFR (Non-


American 78.5 


 


 


BUN/Creatinine Ratio 16.3 (10-20) 


 


Calcium Level


  8.9 mg/dl


(8.5-10.1)





CBC and PRP were obtained today.  Mild anemia.  Otherwise unremarkable.





Medications Administered











 Medications


  (Trade)  Dose


 Ordered  Sig/Lorenzo


 Route  Start Time


 Stop Time Status Last Admin


Dose Admin


 


 Lorazepam


  (Ativan Inj)  0.5 mg  NOW  ONCE


 IV  3/26/17 13:45


 3/26/17 13:46 DC 3/26/17 14:08


0.5 MG





Ativan 0.5 mg IV





ED Course


Patient and his daughter were educated regarding today's findings.  

Conservative care measures were discussed.  IV was established.  Labs were 

obtained.  CT scan imaging of the soft tissue of his neck with IV contrast was 

ordered.  Patient was unable to tolerate lying flat CT scan.  He was brought 

back to the department given Ativan 0.5 mg IV.  Anxiety improved and he was 

able to complete the CT scan imaging.  He remained stable while in the ED.  I 

did observe spitting his saliva.  CT imaging showed narrowing of his airway and 

that 2 separate spots significant narrowing.  I did speak with Dr. Maloney from 

[facility].  He recommended observation of the patient overnight.  

He did provide orders.  Please see his dictation for final management.  Patient 

was seen in conjunction with Dr. Manjarrez, who also evaluated the patient and 

concurred with today's diagnosis and treatment plan.





Medical Decision


Possibility of hematoma, esophageal edema, abscess, and postsurgical edema were 

considered, among others.





Impression





 Primary Impression:  


 Postoperative edema


 Additional Impression:  


 Swallowing difficulty





Departure Information


Referrals


Florentin Esparza M.D. (PCP)





Patient Instructions


[facility]





Problem Qualifiers








 Additional Impression:  


 Swallowing difficulty


 Dysphagia type:  pharyngoesophageal phase  Qualified Codes:  R13.14 - Dysphagia

, pharyngoesophageal phase

## 2017-03-26 NOTE — DIAGNOSTIC IMAGING REPORT
CT OF THE NECK WITH CONTRAST



CT DOSE: 544.82 mGy.cm



CLINICAL HISTORY: Swallowing difficulty status post cervical fusion on 3/22/17. 

  



TECHNIQUE: Axial images of the neck were obtained following intravenous

injection of 92 cc of Optiray 320 IV.



COMPARISON STUDY:  MRI of the cervical spine August 2, 2016.



FINDINGS: Visualized portions of the intracranial contents are unremarkable. A

small amount of fluid is noted within the bilateral mastoid air cells. Major

vasculature of the neck is patent. There are post surgical findings consistent a

C3-C4 fusion via a right anterior approach. There is a small amount of

low-attenuation fluid within the operative bed. There is no large hematoma. Exam

is optimized by streak artifact from the hardware. The airway and esophagus are

suboptimally assessed on this exam. There is mild fluid and infiltration

adjacent to the cervical esophagus as expected in the early postoperative

setting. There is suspected mild airway narrowing although this could be

artifactual. Sensitivity for detection of mucosal lesions is diminished on the

exam. A few small thyroid nodules measure up to 9 mm. Lung apices are clear.



IMPRESSION:  



Postsurgical findings consistent with a C3-C4 fusion via a right anterior

approach. Small amount of low-attenuation fluid within the operative bed is

nonspecific but likely within normal limits in the early postoperative period.

No large hematoma. Mild infiltration and fluid adjacent to the cervical

esophagus which is not unexpected in the early postoperative setting. Suboptimal

evaluation of the airway. Apparent mild airway narrowing could be artifactual

and could be correlated with respiratory symptoms. 







Electronically signed by:  Krishan Evans M.D.

3/26/2017 2:42 PM



Dictated Date/Time:  3/26/2017 2:31 PM

## 2017-03-27 VITALS
SYSTOLIC BLOOD PRESSURE: 151 MMHG | DIASTOLIC BLOOD PRESSURE: 90 MMHG | HEART RATE: 77 BPM | OXYGEN SATURATION: 94 % | TEMPERATURE: 97.52 F

## 2017-03-27 VITALS
DIASTOLIC BLOOD PRESSURE: 106 MMHG | HEART RATE: 80 BPM | SYSTOLIC BLOOD PRESSURE: 165 MMHG | OXYGEN SATURATION: 93 % | TEMPERATURE: 97.88 F

## 2017-03-27 VITALS
HEART RATE: 78 BPM | OXYGEN SATURATION: 95 % | SYSTOLIC BLOOD PRESSURE: 164 MMHG | TEMPERATURE: 98.24 F | DIASTOLIC BLOOD PRESSURE: 102 MMHG

## 2017-03-27 VITALS — SYSTOLIC BLOOD PRESSURE: 163 MMHG | DIASTOLIC BLOOD PRESSURE: 98 MMHG | HEART RATE: 98 BPM

## 2017-03-27 VITALS
OXYGEN SATURATION: 92 % | HEART RATE: 88 BPM | TEMPERATURE: 97.52 F | DIASTOLIC BLOOD PRESSURE: 105 MMHG | SYSTOLIC BLOOD PRESSURE: 162 MMHG

## 2017-03-27 VITALS — SYSTOLIC BLOOD PRESSURE: 164 MMHG | DIASTOLIC BLOOD PRESSURE: 101 MMHG | HEART RATE: 77 BPM

## 2017-03-27 RX ADMIN — METOPROLOL SUCCINATE SCH MG: 25 TABLET, EXTENDED RELEASE ORAL at 08:15

## 2017-03-27 RX ADMIN — DEXAMETHASONE SODIUM PHOSPHATE SCH MLS/MIN: 4 INJECTION, SOLUTION INTRA-ARTICULAR; INTRALESIONAL; INTRAMUSCULAR; INTRAVENOUS; SOFT TISSUE at 10:26

## 2017-03-27 RX ADMIN — SODIUM CHLORIDE SCH MLS/HR: 900 INJECTION, SOLUTION INTRAVENOUS at 13:16

## 2017-03-27 RX ADMIN — DEXAMETHASONE SODIUM PHOSPHATE SCH MLS/MIN: 4 INJECTION, SOLUTION INTRA-ARTICULAR; INTRALESIONAL; INTRAMUSCULAR; INTRAVENOUS; SOFT TISSUE at 18:18

## 2017-03-27 RX ADMIN — SODIUM CHLORIDE SCH MLS/HR: 900 INJECTION, SOLUTION INTRAVENOUS at 23:31

## 2017-03-27 RX ADMIN — TAMSULOSIN HYDROCHLORIDE SCH MG: 0.4 CAPSULE ORAL at 21:41

## 2017-03-27 RX ADMIN — TAMSULOSIN HYDROCHLORIDE SCH MG: 0.4 CAPSULE ORAL at 08:15

## 2017-03-27 RX ADMIN — SODIUM CHLORIDE SCH MLS/HR: 900 INJECTION, SOLUTION INTRAVENOUS at 03:06

## 2017-03-27 RX ADMIN — PANTOPRAZOLE SCH MG: 40 TABLET, DELAYED RELEASE ORAL at 08:15

## 2017-03-27 RX ADMIN — DEXAMETHASONE SODIUM PHOSPHATE SCH MLS/MIN: 4 INJECTION, SOLUTION INTRA-ARTICULAR; INTRALESIONAL; INTRAMUSCULAR; INTRAVENOUS; SOFT TISSUE at 02:00

## 2017-03-27 NOTE — HISTORY & PHYSICAL EXAMINATION
DATE OF ADMISSION:  03/26/2017

 

CHIEF COMPLAINT:  Difficulty swallowing.

 

HISTORY OF PRESENT ILLNESS:  This is a 72-year-old male, well known to me,

status post anterior discectomy and fusion C3-C4 earlier in the week. 

Postoperatively, he did have some swallowing issues but was able to handle

fluid without difficulty and was subsequently discharged home.  He returned

to the ER Sunday evening, again marked worsening of his swallowing.  He had

difficulty controlling his oral secretions.  He was oxygenating without

difficulty.  Denied any shortness of breath, neck pain or upper extremity

symptoms.  Of note, he is status post EGD 2 weeks ago and was describing

significant hoarseness after this procedure.

 

PAST MEDICAL HISTORY:  Significant for arthritis and BPH.

 

PAST SURGICAL HISTORY:  Includes hip surgery, the above-mentioned cervical

spine procedure.

 

MEDICATIONS:  Include simvastatin and Flomax.

 

SOCIAL HISTORY:  He is retired.

 

PHYSICAL EXAMINATION:

GENERAL:  He is in chair at this time, he is quite comfortable.  He does not

appear to be in acute distress.

NECK:  Feels supple.  The dressing is in place without any evidence of

drainage.

EXTREMITIES:  He has excellent strength to testing in upper extremities.

 

ASSESSMENT:  Status post anterior cervical diskectomy and fusion, C3-C4.

 

PLAN:  At this time, will have initiated a course of IV Decadron q. 8 hours. 

We will maintain this for the next 48 hours and assess his progress,

hopefully discharge home in the next day or so.

## 2017-03-28 VITALS
DIASTOLIC BLOOD PRESSURE: 81 MMHG | OXYGEN SATURATION: 96 % | HEART RATE: 89 BPM | TEMPERATURE: 97.52 F | SYSTOLIC BLOOD PRESSURE: 154 MMHG

## 2017-03-28 VITALS
TEMPERATURE: 97.52 F | SYSTOLIC BLOOD PRESSURE: 173 MMHG | DIASTOLIC BLOOD PRESSURE: 93 MMHG | OXYGEN SATURATION: 96 % | HEART RATE: 71 BPM

## 2017-03-28 VITALS — SYSTOLIC BLOOD PRESSURE: 154 MMHG | DIASTOLIC BLOOD PRESSURE: 81 MMHG | HEART RATE: 89 BPM

## 2017-03-28 RX ADMIN — PANTOPRAZOLE SCH MG: 40 TABLET, DELAYED RELEASE ORAL at 09:03

## 2017-03-28 RX ADMIN — SODIUM CHLORIDE SCH MLS/HR: 900 INJECTION, SOLUTION INTRAVENOUS at 09:06

## 2017-03-28 RX ADMIN — DEXAMETHASONE SODIUM PHOSPHATE SCH MLS/MIN: 4 INJECTION, SOLUTION INTRA-ARTICULAR; INTRALESIONAL; INTRAMUSCULAR; INTRAVENOUS; SOFT TISSUE at 09:55

## 2017-03-28 RX ADMIN — METOPROLOL SUCCINATE SCH MG: 25 TABLET, EXTENDED RELEASE ORAL at 09:03

## 2017-03-28 RX ADMIN — DEXAMETHASONE SODIUM PHOSPHATE SCH MLS/MIN: 4 INJECTION, SOLUTION INTRA-ARTICULAR; INTRALESIONAL; INTRAMUSCULAR; INTRAVENOUS; SOFT TISSUE at 02:14

## 2017-03-28 RX ADMIN — TAMSULOSIN HYDROCHLORIDE SCH MG: 0.4 CAPSULE ORAL at 09:03

## 2017-03-28 RX ADMIN — LORAZEPAM PRN MLS/MIN: 2 INJECTION INTRAMUSCULAR; INTRAVENOUS at 00:37

## 2017-03-28 NOTE — DISCHARGE INSTRUCTIONS
Discharge Instructions


Date of Service


Mar 28, 2017.





Admission


Reason for Admission:  Difficulty Swallowing





Discharge


Discharge Diagnosis / Problem:  cervical stenosis





Discharge Goals


Goal(s):  Improve function





Activity Recommendations


Activity Limitations:  per Instructions/Follow-up section





.





Instructions / Follow-Up


Instructions / Follow-Up





ACTIVITY RECOMMENDATIONS:








SELF CARE INSTRUCTIONS AFTER CERVICAL FUSIONS





1.  No smoking.  Smoking drastically decreases the chance of a solid fusion.





2.  No bending, lifting more than 5 pounds, or twisting (roll like a log when


     turning in bed).





3.  You may shower 3 days after surgery.  Thoroughly dry wound.  Do not


     soak in the tub.





4.  Cervical collar:  Must be worn at all times including sleeping.  You may


     remove the brace only to bath, eat and if you are sitting in a recliner.





5.  Please walk as much as you can for exercise.  Gradually increase the 


     distance that you walk as your endurance increases.








SPECIAL CARE INSTRUCTIONS:





**VERY IMPORTANT TO READ AND REVIEW**





A.  Do not take any anti-inflammatory medications (i.e. Indocin, Advil, Aspirin,


     Naprosyn, Aleve, Motrin, etc.) as these may inhibit the chance of a solid 


     fusion.  Tylenol is okay to take.





B.  Your surgical incision has been closed with a cosmetic suture under the skin


     that will dissolve in about 6 weeks.  In 14 days, you can use a pair of 

clean


     scissors and cut the suture that is left outside of the skin at the ends 

of 


     your incision.





C.  Complications are uncommon, but please contact us if you have any signs or 


     symptoms of:


   1.  wound infection (fever higher than 102.5 degrees F, redness, separation


              of wound, drainage, or increasing pain from the incision) 


   2.  blood clots in legs (pain, swelling, redness and warmth in legs)


   3.  urinary tract infection (fever higher than 102.5 degrees, burning upon 


              urination or increased frequency of urination)


   4.  nerve problems (inability to walk on your toes or heels, numbness, loss 


              of bowel or bladder control)


   5.  any other symptoms that concern you.





D.  Please call the office at [phone] if you have any concerns or 

questions 


     about your operation or recovery.








MANAGING PAIN AFTER SPINAL SURGERY





1.  Narcotic medication is intended for short-term use and will be provided for 


     surgical pain.  Surgical pain usually lasts for a period of 4-6 weeks.  

Narcotic


     medication includes Percocet, Vicodin, Darvocet, Tylenol #3 or Lortab.





2.  Longer-term pain is more appropriately treated with non-narcotic medication


    such as Tylenol ES.





3.  Muscle spasm is not appropriately treated with narcotics.  Muscle relaxers 

such


    as Soma, Flexeril or Skelaxin can be used along with Tylenol ES.





4.  Remember that we all live with some "aches and pains".  This is not unusual 

or 


     uncommon after an injury or as we get older.





5.  We will provide appropriate medication within the normal guidelines of 

their 


     prescribed use.  We will also be very cautious and aware of potential abuse


     and extended duration of patients' medication needs.





6.  Please allow 2-3 days to process refills.  Prescriptions will not be mailed 

but 


    must be picked up at the office.








FOLLOW UP VISIT:





Keep your scheduled follow-up appointment. 





Any questions, please call the office at [phone].





Current Hospital Diet


Patient's current hospital diet: Clear Liquid Diet





Discharge Diet


Recommended Diet:  Regular Diet





Pending Studies


Studies pending at discharge:  no





Medical Emergencies








.


Who to Call and When:





Medical Emergencies:  If at any time you feel your situation is an emergency, 

please call 911 immediately.





.





Non-Emergent Contact


Non-Emergency issues call your:  Primary Care Provider


.





"Provider Documentation" section prepared by Lane Maloney.





VTE Core Measure


Inpt VTE Proph given/why not?:  T.E.D. Stockings, SCD's

## 2017-03-28 NOTE — DISCHARGE SUMMARY
PRINCIPAL DIAGNOSIS:  Status post cervical discectomy with postop swallowing

difficulty.

 

HOSPITAL COURSE AS FOLLOWS:  On March 26, the patient was admitted, began IV

Decadron.  He progressed very nicely over the course of the next 2 days,

swallowing markedly improved, subsequently discharged on 03/28 with oral

steroids.

## 2017-06-01 ENCOUNTER — HOSPITAL ENCOUNTER (OUTPATIENT)
Dept: HOSPITAL 45 - C.RAD | Age: 72
Discharge: HOME | End: 2017-06-01
Attending: ORTHOPAEDIC SURGERY
Payer: COMMERCIAL

## 2017-06-01 DIAGNOSIS — T81.9XXA: Primary | ICD-10-CM
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
## 2017-06-01 DIAGNOSIS — R13.10: ICD-10-CM

## 2017-06-01 NOTE — DIAGNOSTIC IMAGING REPORT
VIDEO SWALLOW



HISTORY:      DIFFICULTY SWALLOWING



TECHNIQUE: Video fluoroscopic evaluation of swallowing was performed in the AP

and lateral projections by the speech pathology staff. The patient is fed

nectar-thick and thin liquid barium, a barium coated wafer, and barium pudding. 



FLUOROSCOPY TIME: 1.7 minutes.



COMPARISON STUDY:  None.



FINDINGS: There is normal hyoid excursion and epiglottic deflection. No

significant penetration or aspiration identified. Swallowing function is within

normal limits.



IMPRESSION:  



1. No aspiration identified.

2. Please see the speech pathologist report for detailed findings and

recommendations.







Electronically signed by:  August Fowler M.D.

6/1/2017 11:56 AM



Dictated Date/Time:  6/1/2017 11:54 AM

## 2017-06-01 NOTE — SWALLOWING EVALUATION
REFERRING SPEECH PATHOLOGIST:  n/a



HISTORY:  This 72 year-old male was referred for a VFSS at [facility] in 
order to address c/o persistent dysphagia s/p cervical fusion of C3-4 on 3/26/17.  This 
study was recommended during the patient's inpatient hospitalization for the surgery, but 
was not ordered by the physician for completion during that admission.  The patient has 
other PMH significant for GERD, arthritis, BPH and lumbar stenosis.  In addition, the 
patient stated he has been intubated "at least 10-12 times in my life" and reports having 
had laryngitis after his C-spine fusion in March.  Currently the patient's diet level is 
regular.  His primary complaints are feeling as though cold liquids regurgitate into his 
throat after being swallowed (especially if he bends over after drinking something), 
having some globus sensation with solid foods like soft breads and meats, and having 
production of clear, ropy saliva.  He reports being so anxious about feeling that there 
are liquids pooled in his throat that he requires the use of Ativan at night to sleep.    



PROCEDURE:  The patient was seen in the Radiology Department of [facility] for the VFSS.  Cursory examination of the oral cavity revealed adequate dentition.  
Movement of the articulators was WNL. 



The patient was seated on a stool and was viewed in both the Anterior-Posterior (A-P) and 
Lateral planes.  Volitional phonation exercises completed in the A-P plane revealed 
bilateral vocal fold movement and vocal intensity within functional limits.



In the lateral plane, the patient was given the following boluses:  1 tsp. thin liquid 
barium x 2, single swallow thin liquid barium self-presented from a cup, sequential 
swallows of thin liquid barium self-presented from a cup, 1 tsp. nectar-thick liquid 
barium, single swallow nectar-thick liquid barium self-presented from a cup, 1 tsp. barium 
pudding, and 1 club cracker with barium pudding.



The patient was then repositioned into the A-P plane and given 1 tsp. barium pudding.



RESULTS:  

Oral Stage:  Labial seal, oral bolus hold, bolus preparation, and bolus transport were all 
complete and WFL.  There was bolus material lining the surfaces of the mouth diffusely 
after the initial swallow, but these cleared immediately with a second, 
independently-produced, swallow.  Pharyngeal swallow initiation was timely and occurred 
when the bolus head was in the valleculae. 



The oral stage of the swallow was WFL.   



Pharyngeal Stage:  Velar elevation, laryngeal elevation, anterior hyoid excursion, 
epiglottic inversion, laryngeal vestibular closure, pharyngeal stripping wave, pharyngeal 
contraction, duration and distention of PES opening were complete.  There was trace column 
of contrast between the tongue base and pharyngeal wall and a small collection of contrast 
in the valleculae after the swallow.  This cleared with a second, independently-produced 
swallow.  



There was no penetration or aspiration during this study.  The pharyngeal stage of the 
swallow was considered normal.  



Esophageal Stage:  Complete clearance of a pudding bolus as it transited the esophagus.  
No evidence of esophageal bolus retention.  



SUMMARY/RECOMMENDATIONS:  This patient presents with normal oral-pharyngeal swallowing 
function.  In addition, the patient presents with no overt s/s esophageal dysfunction; 
HOWEVER, his c/o globus sensation, regurgitation after the swallow and ropy saliva are 
suspicious for being the result of esophageal dysmotility of some kind.    The following 
is recommended:

  1. Continue diet as tolerated.  Eat slowly and avoid large amounts of foods and liquids 
at one time.  

  2. Consideration of Barium Swallow Study to more definitively assess esophageal 
function.

  3. Consideration of professional intervention for anxiety management.



A summary of the results and recommendations was discussed with the patient and his wife 
immediately following the study.  They are anticipating f/u with the referring physician.  




Thank you for referral of this patient.  Please contact me at [phone] if any 
additional information is needed.

## 2017-06-07 LAB
ANION GAP SERPL CALC-SCNC: 7 MMOL/L (ref 3–11)
APPEARANCE UR: CLEAR
BASOPHILS # BLD: 0.04 K/UL (ref 0–0.2)
BASOPHILS NFR BLD: 1.2 %
BILIRUB UR-MCNC: (no result) MG/DL
BUN SERPL-MCNC: 11 MG/DL (ref 7–18)
BUN/CREAT SERPL: 11.1 (ref 10–20)
CALCIUM SERPL-MCNC: 8.4 MG/DL (ref 8.5–10.1)
CHLORIDE SERPL-SCNC: 110 MMOL/L (ref 98–107)
CO2 SERPL-SCNC: 26 MMOL/L (ref 21–32)
COLOR UR: YELLOW
COMPLETE: YES
CREAT CL PREDICTED SERPL C-G-VRATE: 85.7 ML/MIN
CREAT SERPL-MCNC: 1 MG/DL (ref 0.6–1.4)
EOSINOPHIL NFR BLD AUTO: 198 K/UL (ref 130–400)
GLUCOSE SERPL-MCNC: 75 MG/DL (ref 70–99)
HCT VFR BLD CALC: 44.2 % (ref 42–52)
IG%: 0 %
IMM GRANULOCYTES NFR BLD AUTO: 24.8 %
LYMPHOCYTES # BLD: 0.85 K/UL (ref 1.2–3.4)
MANUAL MICROSCOPIC REQUIRED?: NO
MCH RBC QN AUTO: 30.4 PG (ref 25–34)
MCHC RBC AUTO-ENTMCNC: 32.4 G/DL (ref 32–36)
MCV RBC AUTO: 93.8 FL (ref 80–100)
MONOCYTES NFR BLD: 11.1 %
NEUTROPHILS # BLD AUTO: 2.6 %
NEUTROPHILS NFR BLD AUTO: 60.3 %
NITRITE UR QL STRIP: (no result)
PH UR STRIP: 5 [PH] (ref 4.5–7.5)
PMV BLD AUTO: 9.1 FL (ref 7.4–10.4)
POTASSIUM SERPL-SCNC: 5.1 MMOL/L (ref 3.5–5.1)
RBC # BLD AUTO: 4.71 M/UL (ref 4.7–6.1)
REVIEW REQ?: NO
SODIUM SERPL-SCNC: 143 MMOL/L (ref 136–145)
SP GR UR STRIP: 1.02 (ref 1–1.03)
URINE BILL WITH OR WITHOUT MIC: (no result)
UROBILINOGEN UR-MCNC: (no result) MG/DL
WBC # BLD AUTO: 3.43 K/UL (ref 4.8–10.8)

## 2017-06-07 NOTE — PAT MEDICATION INSTRUCTIONS
Service Date


Jun 7, 2017.





Current Home Medication List


Amoxicillin (Amoxil), 2,000 MG PO AS DIRECTED


Aspirin (Aspirin Ec), 81 MG PO QPM


Cholecalciferol (Vitamin D 1000 Unit), 1,000 INTER.UNIT PO BID


Colchicine (Colchicine), 0.6 MG PO PRN


Cyanocobalamin (Vitamin B12 500MCG), 500 MCG PO QAM


Iron-Vitamin C (Vitron-C), 1 TAB PO BID


Metoprolol Succ (Toprol Xl) (Toprol-Xl), 12.5 MG PO QAM


Pantoprazole (Protonix), 40 MG PO QAM


Psyllium (Metamucil Fiber), for Constipation


Tadalafil (Cialis), 20 MG PO UD


Tamsulosin Hcl (Flomax), 0.4 MG PO BID





Medication Instructions


For Your Scheduled Surgery 





Colchicine (Colchicine), 0.6 MG PO PRN (not taking currently)





- Hold the following medications 24 hours prior to surgery:


   Tadalafil (Cialis), 20 MG PO UD





- Hold the following medications the morning of surgery:


   Psyllium (Metamucil Fiber), for Constipation


   Iron-Vitamin C (Vitron-C), 1 TAB PO BID


   Cyanocobalamin (Vitamin B12 500MCG), 500 MCG PO QAM


   Cholecalciferol (Vitamin D 1000 Unit), 1,000 INTER.UNIT PO BID


   Amoxicillin (Amoxil), 2,000 MG PO AS DIRECTED





- Take the following medications the morning of surgery with a sip of water:


   Tamsulosin Hcl (Flomax), 0.4 MG PO BID


   Pantoprazole (Protonix), 40 MG PO QAM


   Metoprolol Succ (Toprol Xl) (Toprol-Xl), 12.5 MG PO QAM





- Take the following medications as scheduled the night before surgery:


   Tamsulosin Hcl (Flomax), 0.4 MG PO BID


   Iron-Vitamin C (Vitron-C), 1 TAB PO BID


   Cholecalciferol (Vitamin D 1000 Unit), 1,000 INTER.UNIT PO BID


   Aspirin (Aspirin Ec), 81 MG PO QPM











If you have any questions please call us at [phone] or [phone] (

[employer])  or [phone]

## 2017-07-07 ENCOUNTER — HOSPITAL ENCOUNTER (INPATIENT)
Dept: HOSPITAL 45 - C.ACU | Age: 72
LOS: 3 days | Discharge: HOME | DRG: 460 | End: 2017-07-10
Attending: ORTHOPAEDIC SURGERY | Admitting: ORTHOPAEDIC SURGERY
Payer: COMMERCIAL

## 2017-07-07 VITALS
SYSTOLIC BLOOD PRESSURE: 117 MMHG | OXYGEN SATURATION: 99 % | DIASTOLIC BLOOD PRESSURE: 74 MMHG | TEMPERATURE: 97.7 F | HEART RATE: 100 BPM

## 2017-07-07 VITALS
HEART RATE: 99 BPM | SYSTOLIC BLOOD PRESSURE: 125 MMHG | OXYGEN SATURATION: 98 % | TEMPERATURE: 97.52 F | DIASTOLIC BLOOD PRESSURE: 74 MMHG

## 2017-07-07 VITALS
HEIGHT: 74 IN | BODY MASS INDEX: 31.24 KG/M2 | WEIGHT: 243.39 LBS | BODY MASS INDEX: 31.24 KG/M2 | HEIGHT: 74 IN | WEIGHT: 243.39 LBS

## 2017-07-07 VITALS
TEMPERATURE: 97.7 F | HEART RATE: 73 BPM | DIASTOLIC BLOOD PRESSURE: 85 MMHG | SYSTOLIC BLOOD PRESSURE: 137 MMHG | OXYGEN SATURATION: 98 %

## 2017-07-07 VITALS
SYSTOLIC BLOOD PRESSURE: 119 MMHG | OXYGEN SATURATION: 94 % | DIASTOLIC BLOOD PRESSURE: 64 MMHG | TEMPERATURE: 97.52 F | HEART RATE: 89 BPM

## 2017-07-07 VITALS
HEART RATE: 76 BPM | DIASTOLIC BLOOD PRESSURE: 82 MMHG | TEMPERATURE: 98.24 F | OXYGEN SATURATION: 99 % | SYSTOLIC BLOOD PRESSURE: 141 MMHG

## 2017-07-07 VITALS
TEMPERATURE: 97.7 F | HEART RATE: 93 BPM | OXYGEN SATURATION: 94 % | SYSTOLIC BLOOD PRESSURE: 114 MMHG | DIASTOLIC BLOOD PRESSURE: 71 MMHG

## 2017-07-07 VITALS
DIASTOLIC BLOOD PRESSURE: 80 MMHG | HEART RATE: 87 BPM | OXYGEN SATURATION: 97 % | SYSTOLIC BLOOD PRESSURE: 143 MMHG | TEMPERATURE: 97.7 F

## 2017-07-07 DIAGNOSIS — M48.06: Primary | ICD-10-CM

## 2017-07-07 DIAGNOSIS — Z79.82: ICD-10-CM

## 2017-07-07 DIAGNOSIS — D50.9: ICD-10-CM

## 2017-07-07 PROCEDURE — 0SG0071 FUSION OF LUMBAR VERTEBRAL JOINT WITH AUTOLOGOUS TISSUE SUBSTITUTE, POSTERIOR APPROACH, POSTERIOR COLUMN, OPEN APPROACH: ICD-10-PCS | Performed by: ORTHOPAEDIC SURGERY

## 2017-07-07 PROCEDURE — 0SG30AJ FUSION OF LUMBOSACRAL JOINT WITH INTERBODY FUSION DEVICE, POSTERIOR APPROACH, ANTERIOR COLUMN, OPEN APPROACH: ICD-10-PCS | Performed by: ORTHOPAEDIC SURGERY

## 2017-07-07 PROCEDURE — 0SG3071 FUSION OF LUMBOSACRAL JOINT WITH AUTOLOGOUS TISSUE SUBSTITUTE, POSTERIOR APPROACH, POSTERIOR COLUMN, OPEN APPROACH: ICD-10-PCS | Performed by: ORTHOPAEDIC SURGERY

## 2017-07-07 PROCEDURE — 0ST20ZZ RESECTION OF LUMBAR VERTEBRAL DISC, OPEN APPROACH: ICD-10-PCS | Performed by: ORTHOPAEDIC SURGERY

## 2017-07-07 PROCEDURE — 0ST40ZZ RESECTION OF LUMBOSACRAL DISC, OPEN APPROACH: ICD-10-PCS | Performed by: ORTHOPAEDIC SURGERY

## 2017-07-07 PROCEDURE — 0SG00AJ FUSION OF LUMBAR VERTEBRAL JOINT WITH INTERBODY FUSION DEVICE, POSTERIOR APPROACH, ANTERIOR COLUMN, OPEN APPROACH: ICD-10-PCS | Performed by: ORTHOPAEDIC SURGERY

## 2017-07-07 RX ADMIN — SODIUM CHLORIDE SCH MLS/HR: 900 INJECTION, SOLUTION INTRAVENOUS at 19:28

## 2017-07-07 RX ADMIN — STANDARDIZED SENNA CONCENTRATE AND DOCUSATE SODIUM SCH TAB: 8.6; 5 TABLET ORAL at 21:35

## 2017-07-07 RX ADMIN — HYDROMORPHONE HYDROCHLORIDE PRN MG: 10 INJECTION, SOLUTION INTRAMUSCULAR; INTRAVENOUS; SUBCUTANEOUS at 16:05

## 2017-07-07 RX ADMIN — CEFAZOLIN SCH MLS/HR: 10 INJECTION, POWDER, FOR SOLUTION INTRAVENOUS at 18:44

## 2017-07-07 RX ADMIN — HYDROMORPHONE HYDROCHLORIDE PRN MG: 10 INJECTION, SOLUTION INTRAMUSCULAR; INTRAVENOUS; SUBCUTANEOUS at 14:27

## 2017-07-07 RX ADMIN — HYDROMORPHONE HYDROCHLORIDE PRN MG: 10 INJECTION, SOLUTION INTRAMUSCULAR; INTRAVENOUS; SUBCUTANEOUS at 19:18

## 2017-07-07 RX ADMIN — SODIUM CHLORIDE SCH MLS/HR: 900 INJECTION, SOLUTION INTRAVENOUS at 18:44

## 2017-07-07 RX ADMIN — ONDANSETRON PRN MG: 2 INJECTION INTRAMUSCULAR; INTRAVENOUS at 14:49

## 2017-07-07 RX ADMIN — DEXAMETHASONE SODIUM PHOSPHATE SCH MLS/MIN: 4 INJECTION, SOLUTION INTRA-ARTICULAR; INTRALESIONAL; INTRAMUSCULAR; INTRAVENOUS; SOFT TISSUE at 19:27

## 2017-07-07 RX ADMIN — TAMSULOSIN HYDROCHLORIDE SCH MG: 0.4 CAPSULE ORAL at 21:36

## 2017-07-07 RX ADMIN — Medication SCH MG: at 21:35

## 2017-07-07 NOTE — HISTORY AND PHYSICAL
History & Physical


Date


Jul 7, 2017.





Chief Complaint


back and leg pain





History of Present Illness


The patient is a 72 year old male with complaints of





Past Medical/Surgical History


Medical Problems:


(1) Arthritis


(2) Cervical stenosis of spinal canal


(3) Hernia


(4) Iron deficiency anemia


Surgical Problems:


(1) History of total hip replacement








Additional History


Hepatic Disease:  No


Endocrine Disorder:  No


Kidney Disease:  No


Hypertension:  No


Heart Disease:  No


Bleeding Tendencies:  No


Infectious Diseases:  No





Allergies


Coded Allergies:  


     No Known Allergies (Verified , 7/7/17)





Home Medications


Scheduled


Amoxicillin (Amoxil), 2,000 MG PO AS DIRECTED


Aspirin (Aspirin Ec), 81 MG PO QPM


Cholecalciferol (Vitamin D 1000 Unit), 1,000 INTER.UNIT PO BID


Colchicine (Colchicine), 0.6 MG PO PRN


Cyanocobalamin (Vitamin B12 500MCG), 500 MCG PO QAM


Iron-Vitamin C (Vitron-C), 1 TAB PO BID


Metoprolol Succ (Toprol Xl) (Toprol-Xl), 12.5 MG PO QAM


Pantoprazole (Protonix), 40 MG PO QAM


Tadalafil (Cialis), 20 MG PO UD


Tamsulosin Hcl (Flomax), 0.4 MG PO BID





Scheduled PRN


Psyllium (Metamucil Fiber), for Constipation





Physical Examination


Skin:  warm/dry, no rash


Eyes:  normal inspection, EOMI, sclerae normal


ENT:  normal ENT inspection, pharynx normal


Head:  normocephalic, atraumatic


Neck:  supple, no adenopathy, trachea midline


Respiratory/Chest:  lungs clear, normal breath sounds, no respiratory distress


Cardiovascular:  regular rate, rhythm, no edema, no murmur


Abdomen / GI:  normal bowel sounds, non tender


Back:  normal inspection


Extremities:  normal inspection, normal range of motion


Neurologic/Psych:  no motor/sensory deficits, alert, normal reflexes, oriented 

x 3





Diagnosis


lumbar stenosis





Plan of Treatment


decompression and fusion L4-5 L5-S1

## 2017-07-07 NOTE — ANESTHESIOLOGY PROGRESS NOTE
Anesthesia Post Op Note


Date & Time


Jul 7, 2017 at 14:47





Vital Signs


Pain Intensity:  0





Vital Signs Past 12 Hours








  Date Time  Temp Pulse Resp B/P (MAP) Pulse Ox O2 Delivery O2 Flow Rate FiO2


 


7/7/17 14:31  84 13 133/77 98   


 


7/7/17 14:31  84 13     


 


7/7/17 14:27    150/77    


 


7/7/17 14:26  83 16     


 


7/7/17 14:26  83 16  93   


 


7/7/17 14:21  83 19     


 


7/7/17 14:21  83 19 139/75 95   


 


7/7/17 14:16  75 15 126/70 95   


 


7/7/17 14:16  75 15     


 


7/7/17 14:12    129/90    


 


7/7/17 14:11 36.1 81 14 129/90 96 Mask 10 


 


7/7/17 14:11  77      


 


7/7/17 14:11  77   95   


 


7/7/17 08:31 36.8 76 18 141/82 99 Room Air  











Notes


Mental Status:  alert / awake / arousable, participated in evaluation


Pt Amnestic to Procedure:  Yes


Nausea / Vomiting:  adequately controlled


Pain:  adequately controlled


Airway Patency, RR, SpO2:  stable & adequate


BP & HR:  stable & adequate


Hydration State:  stable & adequate


Anesthetic Complications:  no major complications apparent


Patient conversant, denied voice changes, difficulty swallowing, sorethroat, 

shortness of breath. VSS. No complications.

## 2017-07-07 NOTE — [facility] OPERATIVE REPORT
Operative Report


Operative Date


Jul 7, 2017.





Pre-Operative Diagnosis





Lumbar stenosis





Post-Operative Diagnosis





Lumbar stenosis





Procedure(s) Performed





#1 lumbar decompression medial facetectomy foraminotomies L3 4 L4 5 and


L5-S1.  2 posterior spinal fusion L4 5 L5-S1.  #3 placement of posterior


segmental instrumentation L4 5 L5-S1.  4 interbody fusion L4 5 L5-S1.  5


placement peek cage 15 x 26 mm at L4 5 and 14 x 26 mm at L5-S1.  #6


placement of local harvested morcellized autograft sugars.  #7 physician


if he is going to sponge and Master graft in the posterior lateral gutters


internally graft in the interbody space.





Surgeon


Dr. Lane Maloney





Assistant Surgeon(s)


Dutch Orellana PA-C





Estimated Blood Loss


800mL





Findings


Severe spinal stenosis





Specimens





None





Description of Procedure


Patient was met with preoperatively case discussed AND were dressed.  Patient 

was then taken to the operative suite after undergoing intubation was placed in 

a prone position on the Vignesh table on top of the Shad frame.  Lumbar spine 

was prepped and draped in the normal sterile fashion.  Sharp dissection with 

the assistance of Bovie cautery performed down to and exposing the lamina and 

transverse processes of L4 5 and the sacral alar.  From a caudal to cephalad 

fashion complete laminectomy of L5 for partial laminectomy of L3 was performed 

and dressing severe lateral recess and foraminal stenosis.  Did require 

multilevel facetectomies.  Pedicle screws are then placed in L4 5 S1 levels 

bilaterally with the assistance of fluoroscopy in the appropriate size aimee 

placed.  The transforaminal port and left complete discectomy of L5-S1 was 

performed and plate created subcortical bleeding bone and a 14 x 26 mm peek 

cage filled with Dominique bone grafting Position.  We then proceeded L4 5 and 

again through a transforaminal approach on the left complete discectomy 

performed and perched could subcortical bone and a 15 x 26 motor peek cage 

filled turning bone grafting Position.  Brought in and compressed and final 

position bilaterally.  The transverse processes L4 5 sugar November 2 

subcortical bleeding bone.  Infuse sponges and master graft morselized 

autograft placed in the posterior lateral gutters.  15 round RUMA drain inserted.

  Incision closed 1 Vicryl the fascia tubercle subcutaneous C4 5 closure Steri-

Strip sterile dressing placed patient with continued condition.  Please note 

Dutch Lugo my PA was present throughout the entire procedure in involved 

in patient positioning surgical dissection through the technical components of 

the procedure as well as final skin closure.


I attest to the content of the Intraoperative Record and any orders documented 

therein.  Any exceptions are noted below.

## 2017-07-07 NOTE — DIAGNOSTIC IMAGING REPORT
LUMBAR SPINE 2 OR 3 VIEW



CLINICAL HISTORY: L4-S1 DECOMPRESSION/FUSION/INTERBODY    



TECHNIQUE: Image intensifier



COMPARISON STUDY:  None



FINDINGS: Findings consistent with a posterior laminectomy and fusion at L4-L5

and S1. Disc spaces are present. Alignment is anatomic.



IMPRESSION:  Image intensifier usage for low lumbar laminectomy and fusion. 









Electronically signed by:  Franck Martinez M.D.

7/7/2017 2:04 PM



Dictated Date/Time:  7/7/2017 2:03 PM

## 2017-07-08 VITALS
DIASTOLIC BLOOD PRESSURE: 77 MMHG | OXYGEN SATURATION: 95 % | SYSTOLIC BLOOD PRESSURE: 143 MMHG | TEMPERATURE: 97.7 F | HEART RATE: 68 BPM

## 2017-07-08 VITALS
TEMPERATURE: 97.52 F | OXYGEN SATURATION: 95 % | HEART RATE: 64 BPM | SYSTOLIC BLOOD PRESSURE: 128 MMHG | DIASTOLIC BLOOD PRESSURE: 78 MMHG

## 2017-07-08 VITALS
SYSTOLIC BLOOD PRESSURE: 154 MMHG | HEART RATE: 69 BPM | OXYGEN SATURATION: 99 % | DIASTOLIC BLOOD PRESSURE: 84 MMHG | TEMPERATURE: 97.7 F

## 2017-07-08 VITALS
OXYGEN SATURATION: 97 % | SYSTOLIC BLOOD PRESSURE: 122 MMHG | DIASTOLIC BLOOD PRESSURE: 77 MMHG | HEART RATE: 69 BPM | TEMPERATURE: 98.06 F

## 2017-07-08 LAB
ANION GAP SERPL CALC-SCNC: 8 MMOL/L (ref 3–11)
BASOPHILS # BLD: 0.01 K/UL (ref 0–0.2)
BASOPHILS NFR BLD: 0.2 %
BUN SERPL-MCNC: 13 MG/DL (ref 7–18)
BUN/CREAT SERPL: 14.7 (ref 10–20)
CALCIUM SERPL-MCNC: 7.9 MG/DL (ref 8.5–10.1)
CHLORIDE SERPL-SCNC: 107 MMOL/L (ref 98–107)
CO2 SERPL-SCNC: 24 MMOL/L (ref 21–32)
COMPLETE: YES
CREAT CL PREDICTED SERPL C-G-VRATE: 97.4 ML/MIN
CREAT SERPL-MCNC: 0.88 MG/DL (ref 0.6–1.4)
EOSINOPHIL NFR BLD AUTO: 166 K/UL (ref 130–400)
GLUCOSE SERPL-MCNC: 133 MG/DL (ref 70–99)
HCT VFR BLD CALC: 33.7 % (ref 42–52)
IG%: 0.2 %
IMM GRANULOCYTES NFR BLD AUTO: 6.5 %
LYMPHOCYTES # BLD: 0.42 K/UL (ref 1.2–3.4)
MCH RBC QN AUTO: 30.1 PG (ref 25–34)
MCHC RBC AUTO-ENTMCNC: 32.6 G/DL (ref 32–36)
MCV RBC AUTO: 92.3 FL (ref 80–100)
MONOCYTES NFR BLD: 5.8 %
NEUTROPHILS # BLD AUTO: 0 %
NEUTROPHILS NFR BLD AUTO: 87.3 %
PMV BLD AUTO: 9.2 FL (ref 7.4–10.4)
POTASSIUM SERPL-SCNC: 4.5 MMOL/L (ref 3.5–5.1)
RBC # BLD AUTO: 3.65 M/UL (ref 4.7–6.1)
SODIUM SERPL-SCNC: 139 MMOL/L (ref 136–145)
WBC # BLD AUTO: 6.42 K/UL (ref 4.8–10.8)

## 2017-07-08 RX ADMIN — OXYCODONE HYDROCHLORIDE PRN MG: 5 TABLET ORAL at 17:10

## 2017-07-08 RX ADMIN — LORAZEPAM PRN MG: 0.5 TABLET ORAL at 22:11

## 2017-07-08 RX ADMIN — LORAZEPAM PRN MG: 0.5 TABLET ORAL at 00:33

## 2017-07-08 RX ADMIN — STANDARDIZED SENNA CONCENTRATE AND DOCUSATE SODIUM SCH TAB: 8.6; 5 TABLET ORAL at 20:55

## 2017-07-08 RX ADMIN — SODIUM CHLORIDE SCH MLS/HR: 900 INJECTION, SOLUTION INTRAVENOUS at 01:41

## 2017-07-08 RX ADMIN — ONDANSETRON PRN MG: 2 INJECTION INTRAMUSCULAR; INTRAVENOUS at 17:10

## 2017-07-08 RX ADMIN — CEFAZOLIN SCH MLS/HR: 10 INJECTION, POWDER, FOR SOLUTION INTRAVENOUS at 01:37

## 2017-07-08 RX ADMIN — PANTOPRAZOLE SCH MG: 40 TABLET, DELAYED RELEASE ORAL at 09:38

## 2017-07-08 RX ADMIN — METOPROLOL SUCCINATE SCH MG: 25 TABLET, EXTENDED RELEASE ORAL at 09:39

## 2017-07-08 RX ADMIN — TAMSULOSIN HYDROCHLORIDE SCH MG: 0.4 CAPSULE ORAL at 20:55

## 2017-07-08 RX ADMIN — DEXAMETHASONE SODIUM PHOSPHATE SCH MLS/MIN: 4 INJECTION, SOLUTION INTRA-ARTICULAR; INTRALESIONAL; INTRAMUSCULAR; INTRAVENOUS; SOFT TISSUE at 17:11

## 2017-07-08 RX ADMIN — LORAZEPAM PRN MG: 0.5 TABLET ORAL at 01:37

## 2017-07-08 RX ADMIN — OXYCODONE HYDROCHLORIDE PRN MG: 5 TABLET ORAL at 22:40

## 2017-07-08 RX ADMIN — Medication SCH MG: at 20:55

## 2017-07-08 RX ADMIN — DEXAMETHASONE SODIUM PHOSPHATE SCH MLS/MIN: 4 INJECTION, SOLUTION INTRA-ARTICULAR; INTRALESIONAL; INTRAMUSCULAR; INTRAVENOUS; SOFT TISSUE at 04:02

## 2017-07-08 RX ADMIN — TAMSULOSIN HYDROCHLORIDE SCH MG: 0.4 CAPSULE ORAL at 09:38

## 2017-07-08 NOTE — PROGRESS NOTE
Progress Note


Date of Service


Jul 8, 2017.





Progress Note


Patient is postop day 1.  Leg pain is markedly improved.  Back pain well 

controlled.  Vital signs are stable RUMA drain decreasing appropriately.  On exam 

he is in chair at bedside has excellent strength testing appears very 

comfortable.  Assessment status post lumbar decompression fusion.  Pat this 

time will continue physical therapy advance bowel regimen anticipated discharge 

home Monday.

## 2017-07-08 NOTE — DISCHARGE INSTRUCTIONS
Discharge Instructions


Date of Service


Jul 8, 2017.





Admission


Reason for Admission:  Lumbar Spinal Stenosis





Discharge


Discharge Diagnosis / Problem:  stenosis





Discharge Goals


Goal(s):  Improve function





Activity Recommendations


Activity Limitations:  per Instructions/Follow-up section





.





Instructions / Follow-Up


Instructions / Follow-Up





ACTIVITY RECOMMENDATIONS:





SELF CARE INSTRUCTIONS AFTER THORACIC/LUMBAR FUSIONS





1.  You may walk to your tolerance.  It is good exercise for your legs and back.


      Expect some back and intermittent leg aches and pains.





2.  You may perform "counter-top" level activities (make a sandwich, zenobia 

with a


     project, etc.).





3.  No bending or lifting of more than 10 pounds or back twisting of any nature 

(roll


      like a log when turning in bed).





4.  You may ride in a car for 20-30 minutes at a time.  No driving until after 

your


      first visit with your doctor.





5.  Frequent changes of position and restricting sitting to 30 minutes at a 

time will


      help limit the amount of back spasms and stiffness you may experience.





6.  You may discontinue the use of ambulatory aids (cane, crutches, etc.) once 

your


      strength and confidence allow.





7.  You may  the shower and let water strike your incision when you 

arrive 


     home at least once daily.  Do not take a tub bath, sit in a hot tub or go 

into a


     swimming pool until after your first recheck in the office.








SPECIAL CARE INSTRUCTIONS:





**VERY IMPORTANT TO READ AND REVIEW**





A.  Your surgical incision has been closed with a cosmetic suture under the 


     skin that will dissolve in about 6 weeks.  In 14 days, you can use a pair 


     of clean scissors and cut the suture that is left outside of the skin at 

the 


     ends of your incision.


   1.  The small skin tapes can be removed 7 days after surgery if they 


               have not fallen off by that point.


   2.  You may keep the wound open to air as much as possible to promote


              healing after post-op day number 5 unless told otherwise by your 

doctor.


   3.  If you think the wound looks like it is becoming infected (redness or 


              worsening drainage) and/or you are experiencing fever, chill or 

worsening


              back pain and muscle spasms, contact the office so that we may 

evaluate


              you as soon as possible.





B.  Complications are uncommon, but please contact us if you have any signs or 


     symptoms of:


   1.  wound infection (fever higher than 102.5 degrees F, redness, separation


              of wound, drainage, or increasing pain from the incision) 


   2.  blood clots in legs (pain, swelling, redness and warmth in legs)


   3.  urinary tract infection (fever higher than 102.5 degrees F, burning upon


              urination or increased frequency of urination)


   4.  nerve problems (inability to walk on your toes or heels, numbness, loss 


              of bowel or bladder control)


   5.  any other symptoms that concern you





C.  Please call the office at [phone] if you have any concerns or 

questions


     about your operation or recovery.





D.  No smoking!  Smoking drastically decreases the chance of a solid fusion.





E.  Do not take any anti-inflammatory medications (Indocin, Advil, Motrin, 

Aspirin, 


     Naprosyn, etc.) as these may inhibit the chance of a solid fusion.  

Tylenol is


     okay to take for pain.








MANAGING PAIN AFTER SPINAL SURGERY





1.  Narcotic medication is intended for short-term use and will be provided for 


     surgical pain.  Surgical pain usually lasts for a period of 4-6 weeks.  

Narcotic 


     medication includes Percocet, Vicodin, Darvocet, Tylenol #3 or Lortab.





2.  Longer-term pain is more appropriately treated with non-narcotic medication 


    such as Tylenol ES.





3.  Muscle spasm is not appropriately treated with narcotics.  Muscle relaxers 

such


    as Soma, Flexeril or Skelaxin can be used along with Tylenol ES.





4.  Remember that we all live with some "aches and pains".  This is not unusual 

or 


     uncommon after an injury or as we get older.


   a.  Back pain is expected and may include muscle spasms for 4 to 6 weeks 


              after surgery.  The pain should gradually improve.  If the pain 

worsens for 


              no apparent reason, please contact the office.


   b.  Intermittent leg pain may also be experienced and should not be concerned


        about unless it worsens for no apparent reason.  If so, please contact 

the


               office.





5.  We will provide appropriate medication within the normal guidelines of 

their prescribed


     use.  We will also be very cautious and aware of potential abuse and 

extended


     duration of patients' medication needs.


   a.  Pain medications are for your comfort and to assist with sleep and


        rest so that the tissue can heal.  They are not provided in order to 

return 


              to normal activity and should not be used through the day.  To do 

so or 


              worsening pain at night can result from ongoing tissue damage and 

development


              of tolerance to the prescribed medicine.





6.  Please allow 2-3 days to process refills.  Prescriptions will not be mailed 

but must be


     picked up at the office.








FOLLOW UP VISIT:





Keep your scheduled follow-up appointment. 





Any questions, please call the office at [phone].





Current Hospital Diet


Patient's current hospital diet: Regular Diet





Discharge Diet


Recommended Diet:  Regular Diet





Procedures


Procedures Performed:  


#1 lumbar decompression medial facetectomy foraminotomies L3 4 L4 5 and


L5-S1.  2 posterior spinal fusion L4 5 L5-S1.  #3 placement of posterior


segmental instrumentation L4 5 L5-S1.  4 interbody fusion L4 5 L5-S1.  5


placement peek cage 15 x 26 mm at L4 5 and 14 x 26 mm at L5-S1.  #6


placement of local harvested morcellized autograft sugars.  #7 physician


if he is going to sponge and Master graft in the posterior lateral gutters


internally graft in the interbody space.





Pending Studies


Studies pending at discharge:  no





Medical Emergencies








.


Who to Call and When:





Medical Emergencies:  If at any time you feel your situation is an emergency, 

please call 911 immediately.





.





Non-Emergent Contact


Non-Emergency issues call your:  Primary Care Provider


.








"Provider Documentation" section prepared by Lane Maloney.








.





VTE Core Measure


Inpt VTE Proph given/why not?:  T.E.D. Stockings, SCD's

## 2017-07-09 VITALS
DIASTOLIC BLOOD PRESSURE: 78 MMHG | TEMPERATURE: 97.7 F | SYSTOLIC BLOOD PRESSURE: 134 MMHG | HEART RATE: 62 BPM | OXYGEN SATURATION: 97 %

## 2017-07-09 VITALS
SYSTOLIC BLOOD PRESSURE: 119 MMHG | OXYGEN SATURATION: 96 % | TEMPERATURE: 97.7 F | DIASTOLIC BLOOD PRESSURE: 63 MMHG | HEART RATE: 75 BPM

## 2017-07-09 VITALS
HEART RATE: 82 BPM | OXYGEN SATURATION: 95 % | SYSTOLIC BLOOD PRESSURE: 119 MMHG | TEMPERATURE: 98.78 F | DIASTOLIC BLOOD PRESSURE: 68 MMHG

## 2017-07-09 VITALS — OXYGEN SATURATION: 97 %

## 2017-07-09 RX ADMIN — Medication SCH GM: at 05:34

## 2017-07-09 RX ADMIN — OXYCODONE HYDROCHLORIDE PRN MG: 5 TABLET ORAL at 09:40

## 2017-07-09 RX ADMIN — PANTOPRAZOLE SCH MG: 40 TABLET, DELAYED RELEASE ORAL at 09:06

## 2017-07-09 RX ADMIN — TAMSULOSIN HYDROCHLORIDE SCH MG: 0.4 CAPSULE ORAL at 20:23

## 2017-07-09 RX ADMIN — OXYCODONE HYDROCHLORIDE PRN MG: 5 TABLET ORAL at 19:39

## 2017-07-09 RX ADMIN — OXYCODONE HYDROCHLORIDE PRN MG: 5 TABLET ORAL at 15:31

## 2017-07-09 RX ADMIN — Medication SCH GM: at 23:43

## 2017-07-09 RX ADMIN — Medication SCH GM: at 19:23

## 2017-07-09 RX ADMIN — ONDANSETRON PRN MG: 2 INJECTION INTRAMUSCULAR; INTRAVENOUS at 16:54

## 2017-07-09 RX ADMIN — STANDARDIZED SENNA CONCENTRATE AND DOCUSATE SODIUM SCH TAB: 8.6; 5 TABLET ORAL at 20:23

## 2017-07-09 RX ADMIN — TAMSULOSIN HYDROCHLORIDE SCH MG: 0.4 CAPSULE ORAL at 09:05

## 2017-07-09 RX ADMIN — METOPROLOL SUCCINATE SCH MG: 25 TABLET, EXTENDED RELEASE ORAL at 09:06

## 2017-07-09 RX ADMIN — Medication SCH MG: at 20:23

## 2017-07-09 RX ADMIN — LORAZEPAM PRN MG: 0.5 TABLET ORAL at 21:48

## 2017-07-09 RX ADMIN — Medication SCH GM: at 12:27

## 2017-07-09 RX ADMIN — OXYCODONE HYDROCHLORIDE PRN MG: 5 TABLET ORAL at 05:34

## 2017-07-09 NOTE — ORTHOPEDIC PROGRESS NOTE
Orthopedic Progress Note


Date of Service


Jul 9, 2017.





Subjective


Post OP Day:  2


Reports: feeling well


Additional Notes:


Patient overall is quite pleased.  He states his bowel and plantar have 

improved postoperatively.  He is up and ambulatory.  RUMA drain output is 100 mL 

last shift.  History and physical therapy stimulating 300 feet.  Positive flatus

, no bowel movement.  Leg pain greatly improved.  Numbness has also resolved.





Objective


calves soft nontender, N/V intact, dressing C/D/I, toes mobile











  Date Time  Temp Pulse Resp B/P (MAP) Pulse Ox O2 Delivery O2 Flow Rate FiO2


 


7/9/17 06:14 36.5 62 16 134/78 (96) 97 Room Air  


 


7/8/17 23:15 36.7 69 16 122/77 (92) 97 Room Air  


 


7/8/17 20:45      Room Air  


 


7/8/17 14:51 36.5 68 18 143/77 (99) 95 Room Air  


 


7/8/17 08:08      Room Air  











Assessment & Plan


Assessment:


Postoperative day 2 lumbar decompression fusion doing well








Inhouse Planning


DVT Prophylaxis:  JEFFREY Lis


Additional Notes:


Anticipate discharge home tomorrow.  We'll continue with physical therapy and 

pain control today.





Discharge Planning


Discharge Planning:  home

## 2017-07-10 VITALS
TEMPERATURE: 98.6 F | SYSTOLIC BLOOD PRESSURE: 137 MMHG | HEART RATE: 85 BPM | DIASTOLIC BLOOD PRESSURE: 72 MMHG | OXYGEN SATURATION: 96 %

## 2017-07-10 VITALS
HEART RATE: 85 BPM | OXYGEN SATURATION: 96 % | TEMPERATURE: 98.6 F | SYSTOLIC BLOOD PRESSURE: 137 MMHG | DIASTOLIC BLOOD PRESSURE: 72 MMHG

## 2017-07-10 RX ADMIN — PANTOPRAZOLE SCH MG: 40 TABLET, DELAYED RELEASE ORAL at 07:29

## 2017-07-10 RX ADMIN — METOPROLOL SUCCINATE SCH MG: 25 TABLET, EXTENDED RELEASE ORAL at 07:29

## 2017-07-10 RX ADMIN — OXYCODONE HYDROCHLORIDE PRN MG: 5 TABLET ORAL at 11:10

## 2017-07-10 RX ADMIN — Medication SCH GM: at 05:37

## 2017-07-10 RX ADMIN — TAMSULOSIN HYDROCHLORIDE SCH MG: 0.4 CAPSULE ORAL at 07:29

## 2017-07-10 RX ADMIN — OXYCODONE HYDROCHLORIDE PRN MG: 5 TABLET ORAL at 06:09

## 2017-07-10 NOTE — DISCHARGE SUMMARY
Orthopedic Discharge Summary


Admission Date/Reason


Jul 7, 2017 at 10:00


Lumbar Spinal Stenosis.





Discharge Date/Disposition


Jul 10, 2017


Home





Diagnosis


Principal Diagnosis:


Lumbar stenosis





Admission Physical Exam


As per Admitting History & Physical.





Hospital Course


Patient underwent lumbar decompression and fusion on Friday.  Patient tolerated 

this well.  Postoperatively he was up in inventory turning physical therapy 

well.  His RUMA drain decreased appropriately.  Subsequently he was discharged 

home discharge orders and instructions found on the chart for further review.





Discharge Instructions


Please refer to the electronic Patient Visit Report (Discharge Instructions) 

for additional information.

## 2017-07-10 NOTE — ANESTHESIOLOGY PROGRESS NOTE
Anesthesia Post Op Note


Date & Time


Jul 10, 2017 at 08:08





Vital Signs


Pain Intensity:  0.0





Vital Signs Past 12 Hours








  Date Time  Temp Pulse Resp B/P (MAP) Pulse Ox O2 Delivery O2 Flow Rate FiO2


 


7/10/17 07:10      Room Air  


 


7/10/17 06:34 37.0 85 18 137/72 (93) 96 Room Air  


 


7/9/17 23:40      Room Air  


 


7/9/17 23:35 37.1 82 18 119/68 (85) 95 Room Air  











Notes


Mental Status:  alert / awake / arousable, participated in evaluation


Pt Amnestic to Procedure:  Yes


Nausea / Vomiting:  adequately controlled


Pain:  adequately controlled


Airway Patency, RR, SpO2:  stable & adequate


BP & HR:  stable & adequate


Hydration State:  stable & adequate


Anesthetic Complications:  no major complications apparent

## 2017-12-13 ENCOUNTER — HOSPITAL ENCOUNTER (EMERGENCY)
Dept: HOSPITAL 45 - C.EDB | Age: 72
LOS: 1 days | Discharge: HOME | End: 2017-12-14
Payer: COMMERCIAL

## 2017-12-13 VITALS
WEIGHT: 245.15 LBS | WEIGHT: 245.15 LBS | HEIGHT: 74.02 IN | HEIGHT: 74.02 IN | BODY MASS INDEX: 31.46 KG/M2 | BODY MASS INDEX: 31.46 KG/M2

## 2017-12-13 VITALS — TEMPERATURE: 97.7 F

## 2017-12-13 DIAGNOSIS — Z80.9: ICD-10-CM

## 2017-12-13 DIAGNOSIS — M19.90: ICD-10-CM

## 2017-12-13 DIAGNOSIS — Z91.81: ICD-10-CM

## 2017-12-13 DIAGNOSIS — Z87.891: ICD-10-CM

## 2017-12-13 DIAGNOSIS — Z79.899: ICD-10-CM

## 2017-12-13 DIAGNOSIS — M25.512: Primary | ICD-10-CM

## 2017-12-13 DIAGNOSIS — M48.02: ICD-10-CM

## 2017-12-13 DIAGNOSIS — Z96.649: ICD-10-CM

## 2017-12-13 DIAGNOSIS — D50.9: ICD-10-CM

## 2017-12-13 DIAGNOSIS — M54.9: ICD-10-CM

## 2017-12-13 DIAGNOSIS — Z79.82: ICD-10-CM

## 2017-12-13 LAB
ALP SERPL-CCNC: 97 U/L (ref 45–117)
ALT SERPL-CCNC: 51 U/L (ref 12–78)
ANION GAP SERPL CALC-SCNC: 8 MMOL/L (ref 3–11)
AST SERPL-CCNC: 37 U/L (ref 15–37)
BASOPHILS # BLD: 0.06 K/UL (ref 0–0.2)
BASOPHILS NFR BLD: 1.4 %
BUN SERPL-MCNC: 17 MG/DL (ref 7–18)
BUN/CREAT SERPL: 19.2 (ref 10–20)
CALCIUM SERPL-MCNC: 8.5 MG/DL (ref 8.5–10.1)
CHLORIDE SERPL-SCNC: 105 MMOL/L (ref 98–107)
CKMB/CK RATIO: 1.6 (ref 0–3)
CO2 SERPL-SCNC: 25 MMOL/L (ref 21–32)
COMPLETE: YES
CREAT CL PREDICTED SERPL C-G-VRATE: 98.9 ML/MIN
CREAT SERPL-MCNC: 0.87 MG/DL (ref 0.6–1.4)
EOSINOPHIL NFR BLD AUTO: 178 K/UL (ref 130–400)
GLUCOSE SERPL-MCNC: 94 MG/DL (ref 70–99)
HCT VFR BLD CALC: 39.6 % (ref 42–52)
IG%: 0 %
IMM GRANULOCYTES NFR BLD AUTO: 36.4 %
INR PPP: 1 (ref 0.9–1.1)
LYMPHOCYTES # BLD: 1.52 K/UL (ref 1.2–3.4)
MCH RBC QN AUTO: 30.1 PG (ref 25–34)
MCHC RBC AUTO-ENTMCNC: 33.1 G/DL (ref 32–36)
MCV RBC AUTO: 91 FL (ref 80–100)
MONOCYTES NFR BLD: 8.9 %
NEUTROPHILS # BLD AUTO: 3.3 %
NEUTROPHILS NFR BLD AUTO: 50 %
PARTIAL THROMBOPLASTIN RATIO: 1
PMV BLD AUTO: 9.2 FL (ref 7.4–10.4)
POTASSIUM SERPL-SCNC: 4 MMOL/L (ref 3.5–5.1)
PROTHROMBIN TIME: 10.1 SECONDS (ref 9–12)
RBC # BLD AUTO: 4.35 M/UL (ref 4.7–6.1)
SODIUM SERPL-SCNC: 138 MMOL/L (ref 136–145)
WBC # BLD AUTO: 4.18 K/UL (ref 4.8–10.8)

## 2017-12-13 NOTE — EMERGENCY ROOM VISIT NOTE
History


Report prepared by Jeanne:  Lou Myles


Under the Supervision of:  Dr. Pradeep Ware M.D.


First contact with patient:  22:24


Chief Complaint:  ARM PAIN


Stated Complaint:  PAIN RADIATING DOWN L ARM AND BACK





History of Present Illness


The patient is a 72 year old male who presents to the Emergency Room with 

complaints of persistent left arm pain 4.5 hours PTA. He notes his left arm 

pain worsened with movement. He notes the pain radiated to his left shoulder. 

He currently rates his pain an 8/10 in severity. He states that the pain has 

dissipated, though his left shoulder is numb. He notes that he recently fell 

one week ago. He notes that he ran and fell into a tree. He notes this was a 

bad fall for him, as he has a history of falls and balance issues in the last 

year. He was diagnosed with "stenosis of the spine." He notes neck pain from 

his fall. He notes baseline bilateral leg weakness. He notes shortness of 

breath when he lies down. He notes leg swelling and upper back pain. He denies 

any chest pain, abdominal pain, or leg pain. He notes a history of an aortic 

aneurysm.





   Source of History:  patient


   Onset:  4.5 hours


   Position:  arm (left)


   Symptom Intensity:  8/10


   Quality:  other (left arm pain)


   Timing:  other (persistent)


   Modifying Factors (Worsening):  movement


   Associated Symptoms:  + neck pain, + SOB, + back pain (upper), + weakness (

baseline bilateral leg weakness), + numbness (left arm), No chest pain, No 

abdominal pain


Note:


He notes left arm pain and left shoulder pain. 


He notes leg swelling.


He denies leg pain.





Review of Systems


See HPI for pertinent positives & negatives. A total of 10 systems reviewed and 

were otherwise negative.





Past Medical & Surgical


Medical Problems:


(1) Arthritis


(2) Cervical stenosis of spinal canal


(3) Hernia


(4) Iron deficiency anemia


(5) Lumbar stenosis with neurogenic claudication


Surgical Problems:


(1) History of total hip replacement





Old medical records were reviewed. Nurse's notes were reviewed and I agree with.





Family History





Cancer


Heart disease





Social History


Smoking Status:  Former Smoker


Alcohol Use:  occasionally


Drug Use:  none


Marital Status:  


Housing Status:  lives with family


Occupation Status:  retired





Current/Historical Medications


Scheduled


Amoxicillin (Amoxil), 2,000 MG PO AS DIRECTED


Aspirin (Aspirin Ec), 81 MG PO QPM


Cholecalciferol (Vitamin D 1000 Unit), 1,000 INTER.UNIT PO BID


Colchicine (Colchicine), 0.6 MG PO PRN


Cyanocobalamin (Vitamin B12 500MCG), 500 MCG PO QAM


Iron-Vitamin C (Vitron-C), 1 TAB PO BID


Metoprolol Succ (Toprol Xl) (Toprol-Xl), 12.5 MG PO QAM


Pantoprazole (Protonix), 40 MG PO QAM


Tadalafil (Cialis), 20 MG PO UD


Tamsulosin Hcl (Flomax), 0.4 MG PO BID





Scheduled PRN


Psyllium (Metamucil Fiber), for Constipation





Allergies


Coded Allergies:  


     No Known Allergies (Verified , 12/13/17)





Physical Exam


Vital Signs











  Date Time  Temp Pulse Resp B/P (MAP) Pulse Ox O2 Delivery O2 Flow Rate FiO2


 


12/13/17 22:54  66 20 145/64 97 Room Air  


 


12/13/17 22:20 36.5 60 18 186/78 97 Room Air  











Physical Exam


General: Non-ill appearing older male in no acute distress. 


HEENT: Normal cephalic atraumatic.  Pupils are equal round and reactive to 

light. Right conjunctiva injected with subconjunctival hematoma. Extraocular 

movements are intact.  Oropharynx is pink with moist mucous membranes.  No 

swelling of the mouth lips or tongue.


Neck: Supple with a midline trachea.  No meningeal signs or stiffness, no JVD 

or bruits. No Stridor.


Chest: Clear to auscultation bilaterally.  No wheezes or rhonchi.  No increased 

work of breathing.


Heart: regular rate and rhythm. 


Abdomen: Soft nontender, nondistended without rebound guarding or rigidity.  


Extremities: No cyanosis clubbing or edema. No calf tenderness or assymetry. 

Bruise on left shoulder. FROM of LUE. Normal motor and sensation of LUE with 

normal pulse exam. Trace pedal edema bilaterally.


Spine/Back. Non tender to palpation. No CVA tenderness


Skin: Good turgor without rashes.


Neurologic exam: Cranial nerves two through 12 are intact.  Motor and sensation 

are intact and symmetrical throughout.





Medical Decision & Procedures


ER Provider


Diagnostic Interpretation:


Radiology results as stated below per my review and interpretation:





Chest X-ray; One portable view:





Cardiomegaly; no acute findings on his chest. No pneumothorax.





Shoulder X-ray; Two views:





No fracture or dislocation; possible widening of AC joint in one view.





Laboratory Results


12/13/17 22:50








Red Blood Count 4.35, Mean Corpuscular Volume 91.0, Mean Corpuscular Hemoglobin 

30.1, Mean Corpuscular Hemoglobin Concent 33.1, Mean Platelet Volume 9.2, 

Neutrophils (%) (Auto) 50.0, Lymphocytes (%) (Auto) 36.4, Monocytes (%) (Auto) 

8.9, Eosinophils (%) (Auto) 3.3, Basophils (%) (Auto) 1.4, Neutrophils # (Auto) 

2.09, Lymphocytes # (Auto) 1.52, Monocytes # (Auto) 0.37, Eosinophils # (Auto) 

0.14, Basophils # (Auto) 0.06





12/13/17 22:50

















Test


  12/13/17


22:50 12/13/17


22:53


 


White Blood Count


  4.18 K/uL


(4.8-10.8) 


 


 


Red Blood Count


  4.35 M/uL


(4.7-6.1) 


 


 


Hemoglobin


  13.1 g/dL


(14.0-18.0) 


 


 


Hematocrit 39.6 % (42-52)  


 


Mean Corpuscular Volume


  91.0 fL


() 


 


 


Mean Corpuscular Hemoglobin


  30.1 pg


(25-34) 


 


 


Mean Corpuscular Hemoglobin


Concent 33.1 g/dl


(32-36) 


 


 


Platelet Count


  178 K/uL


(130-400) 


 


 


Mean Platelet Volume


  9.2 fL


(7.4-10.4) 


 


 


Neutrophils (%) (Auto) 50.0 %  


 


Lymphocytes (%) (Auto) 36.4 %  


 


Monocytes (%) (Auto) 8.9 %  


 


Eosinophils (%) (Auto) 3.3 %  


 


Basophils (%) (Auto) 1.4 %  


 


Neutrophils # (Auto)


  2.09 K/uL


(1.4-6.5) 


 


 


Lymphocytes # (Auto)


  1.52 K/uL


(1.2-3.4) 


 


 


Monocytes # (Auto)


  0.37 K/uL


(0.11-0.59) 


 


 


Eosinophils # (Auto)


  0.14 K/uL


(0-0.5) 


 


 


Basophils # (Auto)


  0.06 K/uL


(0-0.2) 


 


 


RDW Standard Deviation


  50.4 fL


(36.4-46.3) 


 


 


RDW Coefficient of Variation


  15.2 %


(11.5-14.5) 


 


 


Immature Granulocyte % (Auto) 0.0 %  


 


Immature Granulocyte # (Auto)


  0.00 K/uL


(0.00-0.02) 


 


 


Prothrombin Time


  10.1 SECONDS


(9.0-12.0) 


 


 


Prothromb Time International


Ratio 1.0 (0.9-1.1) 


  


 


 


Activated Partial


Thromboplast Time 25.9 SECONDS


(21.0-31.0) 


 


 


Partial Thromboplastin Ratio 1.0  


 


Anion Gap


  8.0 mmol/L


(3-11) 


 


 


Est Creatinine Clear Calc


Drug Dose 98.9 ml/min 


  


 


 


Estimated GFR (


American) 98.5 


  


 


 


Estimated GFR (Non-


American 85.0 


  


 


 


BUN/Creatinine Ratio 19.2 (10-20)  


 


Calcium Level


  8.5 mg/dl


(8.5-10.1) 


 


 


Total Bilirubin


  0.3 mg/dl


(0.2-1) 


 


 


Direct Bilirubin


  < 0.1 mg/dl


(0-0.2) 


 


 


Aspartate Amino Transf


(AST/SGOT) 37 U/L (15-37) 


  


 


 


Alanine Aminotransferase


(ALT/SGPT) 51 U/L (12-78) 


  


 


 


Alkaline Phosphatase


  97 U/L


() 


 


 


Total Creatine Kinase


  466 U/L


() 


 


 


Creatine Kinase MB


  7.4 ng/ml


(0.5-3.6) 


 


 


Creatine Kinase MB Ratio 1.6 (0-3.0)  


 


Total Protein


  6.6 gm/dl


(6.4-8.2) 


 


 


Albumin


  3.4 gm/dl


(3.4-5.0) 


 


 


Lipase


  132 U/L


() 


 


 


Bedside Troponin I


  


  < 0.030 ng/ml


(0-0.045)





Laboratory studies as stated above per my review.





ECG


Indication:  other (left arm pain)


Rate (beats per minute):  63


Rhythm:  normal sinus


Findings:  PAC, no acute ischemic change


Change:


PACs are now present when compared to 6/7/2017.





ED Course


2224: Past medical records reviewed. The patient was evaluated in room A10, and 

a complete history and physical examination were performed.





2323: I reassessed the patient at this time. He is feeling better and resting 

comfortably.





Medical Decision


Differentials include, but are not limited to; musculoskeletal etiology, 

cardiac disease, PNX, aneurysm, and infection. 





This patient comes in as described above.  He was placed in room A 10.  He has 

left shoulder pain.  He did fall about a week ago and hit a tree he says he 

falls frequently for the last couple years.  He says is not sure why.  He did 

not pass out .  He said this evening he started having pain in his left arm 

that was severe one time but now he feels better.  He also has some numbness 

behind his scapula.  No chest pain or shortness of breath.  At present he looks 

great.  He  has normal motor ,vascular, sensation and pulse exam.  EKG was 

obtained shows no acute ischemic changes or ectopy.  Multiple blood testing was 

obtained.  His troponin is not elevated and CK-MB is mildly elevated but may be 

more muscular.  He tells me tests run elevated and I confirm this looking 

through the chart .  He has no acute electrolyte or metabolic abnormalities.  

Chest x-ray shows cardiomegaly otherwise unremarkable.  He shoulder x-ray shows 

no fracture or dislocation before meals joint on one view may appear little 

wide however he has no pain on this location on exam.  He tells me he does have 

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 

think we need to get a CAT scan imaging as well.  I think this is unlikely 

causing his symptoms but certainly needs to be looked at.  I will also do a CAT 

scan of his head and neck primarily because he's been falling also with the 

fall it's possibly could've injured his neck and this could be neuropathic pain 

from a nerve root.  He is resting comfortably.  The patient was signed out to 

Dr. White at shift change to will follow-up on the CAT scans and we will 

also do a second troponin.





Medication Reconcilliation


Current Medication List:  was personally reviewed by me





Blood Pressure Screening


Patient's blood pressure:  Elevated blood pressure


needs follow up





Impression





 Primary Impression:  


 Left shoulder pain


 Additional Impression:  


 Left-sided back pain





Scribe Attestation


The scribe's documentation has been prepared under my direction and personally 

reviewed by me in its entirety. I confirm that the note above accurately 

reflects all work, treatment, procedures, and medical decision making performed 

by me.





Departure Information


Referrals


Florentin Esparza M.D. (PCP)





Patient Instructions


My [facility]





Problem Qualifiers

## 2017-12-13 NOTE — DIAGNOSTIC IMAGING REPORT
CHEST ONE VIEW PORTABLE



HISTORY:  72 years-old Male CHEST PAIN acute atypical chest pain



COMPARISON: Chest radiograph 3/6/2017



TECHNIQUE: Portable upright AP view of the chest



FINDINGS: 

Cardiac silhouette is mildly enlarged. Lungs are hypoinflated with

bronchovascular crowding. No pneumothorax or pleural effusion. Bibasilar

opacities are noted suggesting atelectasis. Bones of the chest are grossly

intact. Soft tissue calcifications project over the left breast, unchanged.

There are degenerative changes of the shoulders and spine.



IMPRESSION: Hypoinflation with bronchovascular crowding and probable bibasilar

atelectasis.



The above report was generated using voice recognition software. It may contain

grammatical, syntax or spelling errors.







Electronically signed by:  Hayder Blood M.D.

12/13/2017 11:04 PM



Dictated Date/Time:  12/13/2017 11:02 PM

## 2017-12-14 VITALS — SYSTOLIC BLOOD PRESSURE: 134 MMHG | HEART RATE: 64 BPM | DIASTOLIC BLOOD PRESSURE: 72 MMHG | OXYGEN SATURATION: 98 %

## 2017-12-14 NOTE — DIAGNOSTIC IMAGING REPORT
HEAD WITHOUT CONTRAST (CT)



CLINICAL HISTORY: 72 years-old Male presenting with eval for frequent falls,

dizziness. 



TECHNIQUE: Multidetector CT imaging of the head was performed without the use of

intravenous contrast. IV contrast: None. A dose lowering technique was used

consistent with the principles of ALARA (as low as reasonably achievable). 



COMPARISON: None.



CT DOSE (mGy.cm): The estimated cumulative dose is 1187.95 inclusive of the CT

cervical spine.



FINDINGS: 



 topogram: Anterior cervical fusion hardware.



Ventricles and sulci normal in size. Brain parenchyma normal in appearance with

preserved gray-white differentiation. No mass effect or midline shift. No

hemorrhage or acute territorial infarct. No extra-axial fluid collection. Trace

fluid in the left mastoid air cells. Calvarium intact.    



IMPRESSION:

1.  No acute intracranial abnormality. 







Electronically signed by:  Kodak Whitney M.D.

12/14/2017 7:09 AM



Dictated Date/Time:  12/14/2017 7:06 AM

## 2017-12-14 NOTE — DIAGNOSTIC IMAGING REPORT
L SHOULDER MIN 2 VIEWS ROUTINE



CLINICAL HISTORY: 72 years-old Male presenting with eval for shoulder

pain/trauma. 



TECHNIQUE: Internal rotation, external rotation, Grashey, and transscapular Y

views of the left shoulder were obtained. 



COMPARISON: Chest x-ray from 3/6/2017 and 6/16/2014.



FINDINGS:

The left acromioclavicular joint is widened measuring 11 mm, previously 11 mm in

2014. This implies chronic a.c. separation and ligamentous injury. No acute

fracture or acute malalignment. No subluxation of the humeral head. No advanced

degenerative change. Visualized portion of the left hemithorax within normal

limits. The acromion demonstrates a flat to concave undersurface without

significant evidence of bony spurring to suggest acromiohumeral interval

impingement.



IMPRESSION:

Chronic acromioclavicular joint separation. No acute osseous injury.







Electronically signed by:  Kodak Whitney M.D.

12/14/2017 6:55 AM



Dictated Date/Time:  12/14/2017 6:52 AM

## 2017-12-14 NOTE — DIAGNOSTIC IMAGING REPORT
CERVICAL SPINE W/O





CT DOSE: 1187.95 mGy.cm



HISTORY: Trauma  eval for cervical trauma



TECHNIQUE: Multiaxial CT images of the cervical spine were performed and

reformatted in the sagittal and coronal plane without the use of contrast.  A

dose lowering technique was utilized adhering to the principles of ALARA.



COMPARISON:  3/26/2017



FINDINGS: Operative changes consistent with an anterior fusion C3-C4. No acute

compression deformity. Degenerative disc change throughout. Posterior arch is

intact.



IMPRESSION:  

Degenerative and postoperative change. No acute process.







The above report was generated using voice recognition software.  It may contain

grammatical, syntax or spelling errors.







Electronically signed by:  Franck Martinez M.D.

12/14/2017 6:52 AM



Dictated Date/Time:  12/14/2017 6:51 AM

## 2017-12-14 NOTE — EMERGENCY ROOM VISIT NOTE
ED Visit Note


First contact with patient:  23:43


Patient's signed out to me by Dr. Ware awaiting CAT scan results and repeat 

troponin.  





CT head:


No acute intracranial hemorrhage, skull fracture, or other acute intracranial 

abnormality.


Radiologist: Reina Ramirez M.D.





CT C-spine:


Comparison: CT neck dated 3/26/2017.


No acute fracture or traumatic subluxation of the cervical spine.


Stable postsurgical changes of anterior spinal fusion at C3-4 with increased 

fusion at the C3-4 disc space compared to prior CT.


Degenerative changes of the cervical spine.


Prominence of bilateral parotid glands, stable.


Radiologist: Reina Sumner M.D.





CTA chest:


No thoracic aortic dissection.


Scattered atherosclerotic vascular disease with mildly ectatic ascending 

thoracic aorta measuring 4.2 cm.


No focal consolidation, pulmonary edema, pleural effusion or pneumothorax.


Normal cardiac size.  No pericardial effusion.


Moderate to large hiatal hernia.


Radiologist: Reina Sumner M.D.





Patient updated in all results at bedside.  No change in condition.  Vital 

signs stable.  Second trop negative.  Patient agreeable with plan for discharge 

and follow-up.  Patient offered additional pain medications here and declined, 

stating he prefers to take Tylenol as needed.  Patient well-appearing at time 

of discharge.

## 2017-12-14 NOTE — DIAGNOSTIC IMAGING REPORT
CT ANGIOGRAM OF THE CHEST COMBO



CLINICAL HISTORY: Falls. Dizziness. Atypical chest pain.



COMPARISON STUDY:  Chest x-ray dated 3/6/2017. Chest CT dated 6/16/2014.



TECHNIQUE: Before and following the IV administration of 94 cc of Optiray 320,

CT angiogram of the chest was performed from the thoracic inlet to the upper

abdomen utilizing the dissection protocol. Images are reviewed in the axial,

sagittal, and coronal planes. 3-D MIPS images are created and assessed. IV

contrast was administered without complication.  A dose lowering technique was

utilized adhering to the principles of ALARA.



CT DOSE: 2265.12 mGy.cm



FINDINGS:



Thyroid: Imaged portions of the thyroid gland are normal in size and

attenuation.



Thoracic aorta: No intramural hematoma is seen on the unenhanced series. The

thoracic aorta is normal in caliber and demonstrates bovine variant arch

anatomy. No dissection is seen. The arch vessels are widely patent.



Pulmonary vasculature: The pulmonary trunk is normal in caliber. The pulmonary

vessels are not well opacified.



Heart: The heart is mildly enlarged and without pericardial effusion. There are

coronary artery calcifications.



Lungs and pleural spaces: The lungs and pleural spaces are clear.



Mediastinum: There are scattered subcentimeter mediastinal lymph nodes. These

are not pathologically enlarged by size criteria.



Jennifer: Clear.



Axillae: There is no axillary lymphadenopathy. Calcifications are seen in the

left axillary region.



Upper abdomen: There is a large hiatal hernia. A 1.3 cm left adrenal nodule

meets CT criteria for a fat-containing adenoma.



Skeletal structures: The skeletal structures are osteopenic. Degenerative change

is seen throughout the thoracic spine and in the shoulders. No lytic or blastic

bony lesions are seen.





IMPRESSION: 



1. There is no aneurysm or dissection seen involving the thoracic aorta.



2. The lungs are clear.



3. Mild cardiac enlargement.



4. Additional findings as above.







Electronically signed by:  Long Tiwari M.D.

12/14/2017 7:43 AM



Dictated Date/Time:  12/14/2017 7:38 AM

## 2018-04-03 ENCOUNTER — HOSPITAL ENCOUNTER (OUTPATIENT)
Dept: HOSPITAL 45 - C.ULTR | Age: 73
End: 2018-04-03
Attending: NURSE PRACTITIONER
Payer: COMMERCIAL

## 2018-04-03 DIAGNOSIS — R10.31: Primary | ICD-10-CM

## 2018-04-03 NOTE — DIAGNOSTIC IMAGING REPORT
KUB



CLINICAL HISTORY: 73 years-old Male presenting with RIGHT LOWER QUAD pain,

constipation. 



TECHNIQUE: Single supine view of the abdomen was obtained.



COMPARISON: CT of abdomen pelvis from 9/14/2016.



FINDINGS:

No significant stool burden. Nonobstructive bowel gas pattern. No gross

pneumoperitoneum. Multiple surgical clips project over the left hemipelvis.



Allowing for bowel gas and stool, no calcifications to suggest nephrolithiasis.



Total hip arthroplasties with significant radiolucency surrounding the

acetabular components. No malalignment or other hardware complication is

apparent. Bilateral posterior transpedicular screw and aimee fixation of L4-S1

with interbody spacers at L4-5 and L5-S1. Lung bases clear. 



IMPRESSION:

1.  No significant stool burden suggest constipation. No bowel obstruction.



2.  Radiolucency along the acetabular components of the bilateral total hip

arthroplasties could suggest particle disease. No other hardware complication.







Electronically signed by:  Kodak Whitney M.D.

4/3/2018 11:41 AM



Dictated Date/Time:  4/3/2018 11:39 AM

## 2018-04-03 NOTE — DIAGNOSTIC IMAGING REPORT
RIGHT LOWER QUADRANT/INGUINAL ULTRASOUND.



CLINICAL HISTORY: Right lower quadrant pain. Possible hernia.    



COMPARISON STUDY:  CT scan dated 9/14/2016



FINDINGS: There is a suspected small inguinal hernia, not well demonstrated on

this study. A echogenic structure located superior to the canal, likely

represents an incidental lymph node.



IMPRESSION:  There is a suspected but not definite small fat-containing right

inguinal hernia. 









Electronically signed by:  August Fowler M.D.

4/3/2018 11:10 AM



Dictated Date/Time:  4/3/2018 11:06 AM

## 2018-05-17 ENCOUNTER — HOSPITAL ENCOUNTER (OUTPATIENT)
Dept: HOSPITAL 45 - C.MRIBC | Age: 73
Discharge: HOME | End: 2018-05-17
Attending: ORTHOPAEDIC SURGERY
Payer: COMMERCIAL

## 2018-05-17 DIAGNOSIS — M48.062: Primary | ICD-10-CM

## 2018-05-17 NOTE — DIAGNOSTIC IMAGING REPORT
MRI LUMBAR SPINE W/O CONTRAST



CLINICAL HISTORY: Low back pain with left leg radiculopathy.    



TECHNIQUE: Sagittal and axial T1, T2 and STIR images were obtained.



COMPARISON STUDY:  July 7, 2016 



OBSERVATIONS:



There are no suspicious areas of marrow replacement. There are postsurgical

changes of discectomies and interbody fusion at the L4-5 and L5-S1 levels. There

is posterior pedicle screw fixation.



L1-2: There is a minor circumferential disc bulge. There is no spinal or

foraminal stenosis



L2-3: No disc protrusions or extrusions. No evidence of spinal canal or neural

foraminal compromise.



L3-4: There is a circumferential disc bulge. There is facet joint hypertrophy.

There is moderate spinal stenosis.



L4-5: Postsurgical changes are present this level. There is a posterior

laminectomy. There is no significant spinal or foraminal stenosis



L5-S1: There are postsurgical changes of posterior laminectomy. There is no

recurrent disc herniation. There is no significant spinal or foraminal stenosis.



The conus medullaris and cauda equina appear normal.



There is right psoas atrophy.



IMPRESSION: 

1. Postsurgical changes at the L4-5 and L5-S1 levels

2. Disc bulge and facet joint arthropathy at the L3-4 level. There is moderate

spinal stenosis which is progressive when compared with the preceding study







Electronically signed by:  Auguts Fowler M.D.

5/17/2018 2:37 PM



Dictated Date/Time:  5/17/2018 2:32 PM